# Patient Record
Sex: FEMALE | Race: WHITE | NOT HISPANIC OR LATINO | Employment: OTHER | URBAN - METROPOLITAN AREA
[De-identification: names, ages, dates, MRNs, and addresses within clinical notes are randomized per-mention and may not be internally consistent; named-entity substitution may affect disease eponyms.]

---

## 2017-11-22 ENCOUNTER — OFFICE VISIT (OUTPATIENT)
Dept: ORTHOPEDIC SURGERY | Facility: CLINIC | Age: 72
End: 2017-11-22

## 2017-11-22 DIAGNOSIS — M54.50 LUMBAR SPINE PAIN: Primary | ICD-10-CM

## 2017-11-22 DIAGNOSIS — M48.061 SPINAL STENOSIS OF LUMBAR REGION, UNSPECIFIED WHETHER NEUROGENIC CLAUDICATION PRESENT: ICD-10-CM

## 2017-11-22 PROCEDURE — 72100 X-RAY EXAM L-S SPINE 2/3 VWS: CPT | Performed by: ORTHOPAEDIC SURGERY

## 2017-11-22 PROCEDURE — 99214 OFFICE O/P EST MOD 30 MIN: CPT | Performed by: ORTHOPAEDIC SURGERY

## 2017-11-22 NOTE — PROGRESS NOTES
New patient or new problem visit    CC: Left lumbosacral pain    HPI: 6 week history of increasing pain in the left lumbosacral area occasional intermittent the radiation of pain is staying the same or worsening not improving.  It's aggravated by Pilates.  The size seems to help.  She is status post L1 kyphoplasty.  She does see her doctor for treatment of osteoporosis.    PFSH: See attached    ROS: See attached    PE: Constitutional: Vital signs above-noted.  Awake, alert and oriented    Psychiatric: Affect and insight do not appear grossly disturbed.    Pulmonary: Breathing is unlabored, color is good.    Skin: Warm, dry and normal turgor    Cardiac:  pedal pulses intact.  No edema.    Eyesight and hearing appear adequate for examination purposes      Musculoskeletal:  There is him right lumbosacral tenderness to percussion and palpation of the spine. Motion appears undisturbed.  Posture is unremarkable to coronal and sagittal inspection.    The skin about the area is intact.  There is no palpable or visible deformity.  There is no local spasm.       Neurologic:   Reflexes are 2+ and symmetrical in the patellae and achilles.   Motor function is undisturbed in quadriceps, EHL, and gastrocnemius      Sensation appears symmetrically intact to light touch   .  In the bilateral lower extremities there is no evidence of atrophy.   Clonus is absent..  Gait appears undisturbed.  SLR test negative    XRAY: Plain film x-rays of lumbar spine are fairly unremarkable no new fractures no change from prior films.    Other: n/a    Impression: I'm concerned she could have a sacral stress fracture alternatively this could be radiating from the back.  Indeed she has some ankle pain without soreness or tenderness which may suggest the referred picture.    Plan: Plan MRI scan of the lumbar spine to look for a sacral and/or lumbar fracture or neurologic compression.  I will call her with results.

## 2017-12-05 ENCOUNTER — APPOINTMENT (OUTPATIENT)
Dept: MRI IMAGING | Facility: HOSPITAL | Age: 72
End: 2017-12-05
Attending: ORTHOPAEDIC SURGERY

## 2017-12-12 ENCOUNTER — HOSPITAL ENCOUNTER (OUTPATIENT)
Dept: MRI IMAGING | Facility: HOSPITAL | Age: 72
Discharge: HOME | End: 2017-12-12
Attending: ORTHOPAEDIC SURGERY | Admitting: ORTHOPAEDIC SURGERY

## 2017-12-12 DIAGNOSIS — M54.50 LUMBAR SPINE PAIN: ICD-10-CM

## 2017-12-12 DIAGNOSIS — M48.061 SPINAL STENOSIS OF LUMBAR REGION, UNSPECIFIED WHETHER NEUROGENIC CLAUDICATION PRESENT: ICD-10-CM

## 2017-12-12 PROCEDURE — 72148 MRI LUMBAR SPINE W/O DYE: CPT

## 2017-12-18 ENCOUNTER — TELEPHONE (OUTPATIENT)
Dept: ORTHOPEDIC SURGERY | Facility: CLINIC | Age: 72
End: 2017-12-18

## 2017-12-19 DIAGNOSIS — M48.061 SPINAL STENOSIS OF LUMBAR REGION, UNSPECIFIED WHETHER NEUROGENIC CLAUDICATION PRESENT: Primary | ICD-10-CM

## 2017-12-19 NOTE — TELEPHONE ENCOUNTER
Spoke with pt and she was informed of her results.  She has been to PT in the past but is willing to try it again.  Order has been placed for her to go to PT at [facility] in [place], per her request.

## 2017-12-19 NOTE — TELEPHONE ENCOUNTER
"Tell her the lumbar MRI looks pretty good I don't see any obvious evidence of herniated disc, compressed nerve or fracture.  Unfortunately I don't see anything to \"go after \".  See if she once to go to physical therapy and send her there.  "

## 2018-06-21 ENCOUNTER — OFFICE VISIT (OUTPATIENT)
Dept: ORTHOPEDIC SURGERY | Facility: CLINIC | Age: 73
End: 2018-06-21

## 2018-06-21 VITALS — HEIGHT: 65 IN | BODY MASS INDEX: 25.29 KG/M2 | TEMPERATURE: 98 F | WEIGHT: 151.8 LBS

## 2018-06-21 DIAGNOSIS — G89.29 CHRONIC PAIN OF LEFT KNEE: Primary | ICD-10-CM

## 2018-06-21 DIAGNOSIS — M25.562 CHRONIC PAIN OF LEFT KNEE: Primary | ICD-10-CM

## 2018-06-21 PROCEDURE — 73562 X-RAY EXAM OF KNEE 3: CPT | Performed by: ORTHOPAEDIC SURGERY

## 2018-06-21 PROCEDURE — 99213 OFFICE O/P EST LOW 20 MIN: CPT | Performed by: ORTHOPAEDIC SURGERY

## 2018-06-21 NOTE — PROGRESS NOTES
Patient: Dorinda Elder  YOB: 1945 73 y.o. female  Medical Record Number: [id number]    Chief Complaints:   Chief Complaint   Patient presents with   • Left Knee - Follow-up, Pain       History of Present Illness:Dorinda Elder is a 73 y.o. female who presents for follow-up of  Left knee.  She has moderate aching throughout the knee also some pain in the posterior aspect.  It is worse with certain activities.  She does water exercises 4 days a week but still having moderate discomfort in the knee which she rates as a 6 on a scale of 1-10.  It has worsened over the last 6 months or so.    Allergies: No Known Allergies    Medications:   Current Outpatient Prescriptions   Medication Sig Dispense Refill   • Calcium 500 MG tablet Take 600 mg by mouth.     • Cholecalciferol (VITAMIN D3) 2000 UNITS tablet Take  by mouth.     • estradiol (MINIVELLE, VIVELLE-DOT) 0.05 MG/24HR patch Place 1 patch on the skin 1 (One) Time Per Week.     • fluconazole (DIFLUCAN) 150 MG tablet Take one tablet by mouth as needed for yeast infection, may repeat in 3 days if needed 2 tablet 0   • Misc Natural Products (GLUCOSAMINE CHONDROITIN ADV PO) Take  by mouth.     • Multiple Vitamin tablet Take  by mouth.     • OMEGA 3-6-9 FATTY ACIDS PO Take  by mouth.     • phenazopyridine (PYRIDIUM) 200 MG tablet Take 1 tablet by mouth 3 (Three) Times a Day As Needed for bladder spasms. 6 tablet 0     No current facility-administered medications for this visit.          The following portions of the patient's history were reviewed and updated as appropriate: allergies, current medications, past family history, past medical history, past social history, past surgical history and problem list.    Review of Systems:   A 14 point review of systems was performed. All systems negative except pertinent positives/negative listed in HPI above    Physical Exam:   Vitals:    06/21/18 0943   Temp: 98 °F (36.7 °C)   Weight: 68.9 kg (151 lb 12.8 oz)  "  Height: 165.1 cm (65\")   PainSc:   6       General: A and O x 3, ASA, NAD    SCLERA:    Normal    DENTITION:   Normal  Knee:  left    ALIGNMENT:     Slight valgus  ,   Patella  tracks  midline    GAIT:    Antalgic    SKIN:    No abnormality    RANGE OF MOTION:   0  -  125   DEG    STRENGTH:   4  / 5    LIGAMENTS:    No varus / valgus instability.   Negative  Lachman.    MENISCUS:     Negative   Samantha       DISTAL PULSES:    Paplable    DISTAL SENSATION :   Intact    LYMPHATICS:     No   lymphadenopathy    OTHER:          - Positive   effusion      - Crepitance with ROM       Radiology:  Xrays 3views left knee (ap,lateral, sunrise) were ordered and reviewed for evaluation of knee pain demonstratingmoderate joint space narrowing  todays xrays were compared to previous xrays and demonstrate no change    Assessment/Plan:  Left knee moderate valgus also arthritis.  I'm going to get her approved for Synvisc injection will have her return and see Tamara in a couple weeks for Synvisc injection of the left knee.  "

## 2018-07-27 ENCOUNTER — CLINICAL SUPPORT (OUTPATIENT)
Dept: ORTHOPEDIC SURGERY | Facility: CLINIC | Age: 73
End: 2018-07-27

## 2018-07-27 VITALS — HEIGHT: 65 IN | TEMPERATURE: 98.2 F | WEIGHT: 145 LBS | BODY MASS INDEX: 24.16 KG/M2

## 2018-07-27 DIAGNOSIS — M25.562 CHRONIC PAIN OF LEFT KNEE: ICD-10-CM

## 2018-07-27 DIAGNOSIS — G89.29 CHRONIC PAIN OF LEFT KNEE: ICD-10-CM

## 2018-07-27 PROCEDURE — 20610 DRAIN/INJ JOINT/BURSA W/O US: CPT | Performed by: NURSE PRACTITIONER

## 2018-07-27 RX ORDER — LIDOCAINE HYDROCHLORIDE 20 MG/ML
2 INJECTION, SOLUTION EPIDURAL; INFILTRATION; INTRACAUDAL; PERINEURAL
Status: COMPLETED | OUTPATIENT
Start: 2018-07-27 | End: 2018-07-27

## 2018-07-27 RX ADMIN — LIDOCAINE HYDROCHLORIDE 2 ML: 20 INJECTION, SOLUTION EPIDURAL; INFILTRATION; INTRACAUDAL; PERINEURAL at 14:29

## 2018-07-27 NOTE — PROGRESS NOTES
7/27/2018    Dorinda Elder is here today for worsening knee pain. Pt has undergone injection of the knee in the past with good resolution of symptoms. Pt is requesting a repeat injection.     KNEE Injection Procedure Note:    Large Joint Arthrocentesis  Date/Time: 7/27/2018 2:29 PM  Consent given by: patient  Site marked: site marked  Timeout: Immediately prior to procedure a time out was called to verify the correct patient, procedure, equipment, support staff and site/side marked as required   Supporting Documentation  Indications: pain   Procedure Details  Location: knee - L knee  Preparation: Patient was prepped and draped in the usual sterile fashion  Needle size: 22 G  Approach: anterolateral  Medications administered: 2 mL lidocaine PF 2% 2 %; 48 mg hylan 48 MG/6ML        Prior to injection risks were discussed clean pain, infection, leg blood sugar.  Patient verbalized understanding would like to proceed with injection    At the conclusion of the injection I discussed the importance of continued quad strengthening exercises on a daily basis. I will see the patient back if the symptoms should fail to improve or worsen.    Adriana Salamanca MA  7/27/2018

## 2018-08-20 ENCOUNTER — LAB (OUTPATIENT)
Dept: LAB | Facility: HOSPITAL | Age: 73
End: 2018-08-20

## 2018-08-20 ENCOUNTER — TRANSCRIBE ORDERS (OUTPATIENT)
Dept: LAB | Facility: HOSPITAL | Age: 73
End: 2018-08-20

## 2018-08-20 ENCOUNTER — HOSPITAL ENCOUNTER (OUTPATIENT)
Dept: CARDIOLOGY | Facility: HOSPITAL | Age: 73
Discharge: HOME | End: 2018-08-20
Admitting: SURGERY

## 2018-08-20 ENCOUNTER — TRANSCRIBE ORDERS (OUTPATIENT)
Dept: CARDIOLOGY | Facility: HOSPITAL | Age: 73
End: 2018-08-20

## 2018-08-20 DIAGNOSIS — E87.8 ELECTROLYTE IMBALANCE: Primary | ICD-10-CM

## 2018-08-20 DIAGNOSIS — E87.8 ELECTROLYTE IMBALANCE: ICD-10-CM

## 2018-08-20 LAB
DEPRECATED RDW RBC AUTO: 46.8 FL (ref 37–54)
ERYTHROCYTE [DISTWIDTH] IN BLOOD BY AUTOMATED COUNT: 13.6 % (ref 11.7–13)
HCT VFR BLD AUTO: 40.8 % (ref 35.6–45.5)
HGB BLD-MCNC: 12.8 G/DL (ref 11.9–15.5)
MCH RBC QN AUTO: 29.8 PG (ref 26.9–32)
MCHC RBC AUTO-ENTMCNC: 31.4 G/DL (ref 32.4–36.3)
MCV RBC AUTO: 95.1 FL (ref 80.5–98.2)
PLATELET # BLD AUTO: 223 10*3/MM3 (ref 140–500)
PMV BLD AUTO: 9.7 FL (ref 6–12)
POTASSIUM BLD-SCNC: 4.2 MMOL/L (ref 3.5–5.2)
RBC # BLD AUTO: 4.29 10*6/MM3 (ref 3.9–5.2)
WBC NRBC COR # BLD: 4.86 10*3/MM3 (ref 4.5–10.7)

## 2018-08-20 PROCEDURE — 84132 ASSAY OF SERUM POTASSIUM: CPT

## 2018-08-20 PROCEDURE — 85027 COMPLETE CBC AUTOMATED: CPT

## 2018-08-20 PROCEDURE — 36415 COLL VENOUS BLD VENIPUNCTURE: CPT

## 2018-08-20 PROCEDURE — 93005 ELECTROCARDIOGRAM TRACING: CPT

## 2018-08-20 PROCEDURE — 93010 ELECTROCARDIOGRAM REPORT: CPT | Performed by: INTERNAL MEDICINE

## 2018-08-21 ENCOUNTER — TRANSCRIBE ORDERS (OUTPATIENT)
Dept: CARDIOLOGY | Facility: HOSPITAL | Age: 73
End: 2018-08-21

## 2018-08-21 ENCOUNTER — HOSPITAL ENCOUNTER (OUTPATIENT)
Dept: CARDIOLOGY | Facility: HOSPITAL | Age: 73
Discharge: HOME | End: 2018-08-21
Admitting: SURGERY

## 2018-08-21 DIAGNOSIS — E87.8 ELECTROLYTE IMBALANCE: ICD-10-CM

## 2018-08-21 DIAGNOSIS — E87.8 ELECTROLYTE IMBALANCE: Primary | ICD-10-CM

## 2018-08-21 PROCEDURE — 93005 ELECTROCARDIOGRAM TRACING: CPT | Performed by: SURGERY

## 2018-08-21 PROCEDURE — 93010 ELECTROCARDIOGRAM REPORT: CPT | Performed by: INTERNAL MEDICINE

## 2018-10-22 PROBLEM — S22.009A CLOSED FRACTURE OF DORSAL (THORACIC) VERTEBRA (HCC): Status: ACTIVE | Noted: 2018-10-22

## 2018-10-23 ENCOUNTER — OFFICE VISIT (OUTPATIENT)
Dept: SURGERY | Facility: CLINIC | Age: 73
End: 2018-10-23

## 2018-10-23 VITALS
SYSTOLIC BLOOD PRESSURE: 120 MMHG | RESPIRATION RATE: 19 BRPM | HEART RATE: 91 BPM | WEIGHT: 145 LBS | BODY MASS INDEX: 24.16 KG/M2 | DIASTOLIC BLOOD PRESSURE: 80 MMHG | HEIGHT: 65 IN | OXYGEN SATURATION: 94 %

## 2018-10-23 DIAGNOSIS — K62.89 DECREASED ANAL SPHINCTER TONE: ICD-10-CM

## 2018-10-23 DIAGNOSIS — N81.6 RECTOCELE: ICD-10-CM

## 2018-10-23 DIAGNOSIS — R15.1 FECAL SOILING: Primary | ICD-10-CM

## 2018-10-23 PROCEDURE — 99213 OFFICE O/P EST LOW 20 MIN: CPT | Performed by: COLON & RECTAL SURGERY
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
## 2018-11-29 ENCOUNTER — TELEPHONE (OUTPATIENT)
Dept: ORTHOPEDIC SURGERY | Facility: CLINIC | Age: 73
End: 2018-11-29

## 2018-11-29 NOTE — TELEPHONE ENCOUNTER
Patient scheduled Synvisc injection of the left knee on 2/1/19. Need order in [facility] please.

## 2019-02-08 ENCOUNTER — OFFICE VISIT (OUTPATIENT)
Dept: ORTHOPEDIC SURGERY | Facility: CLINIC | Age: 74
End: 2019-02-08

## 2019-02-08 VITALS — WEIGHT: 154 LBS | BODY MASS INDEX: 26.29 KG/M2 | HEIGHT: 64 IN | TEMPERATURE: 98.2 F

## 2019-02-08 DIAGNOSIS — M17.12 PRIMARY OSTEOARTHRITIS OF LEFT KNEE: Primary | ICD-10-CM

## 2019-02-08 PROCEDURE — 20610 DRAIN/INJ JOINT/BURSA W/O US: CPT | Performed by: NURSE PRACTITIONER

## 2019-02-08 RX ORDER — LIDOCAINE HYDROCHLORIDE 10 MG/ML
4 INJECTION, SOLUTION EPIDURAL; INFILTRATION; INTRACAUDAL; PERINEURAL
Status: COMPLETED | OUTPATIENT
Start: 2019-02-08 | End: 2019-02-08

## 2019-02-08 RX ADMIN — LIDOCAINE HYDROCHLORIDE 4 ML: 10 INJECTION, SOLUTION EPIDURAL; INFILTRATION; INTRACAUDAL; PERINEURAL at 14:09

## 2019-02-08 NOTE — PROGRESS NOTES
2/8/2019    Dorinda Elder is here today for worsening knee pain. Pt has undergone injection of the knee in the past with good resolution of symptoms. Pt is requesting a repeat injection.     KNEE Injection Procedure Note:    Large Joint Arthrocentesis: L knee  Date/Time: 2/8/2019 2:09 PM  Consent given by: patient  Site marked: site marked  Timeout: Immediately prior to procedure a time out was called to verify the correct patient, procedure, equipment, support staff and site/side marked as required   Supporting Documentation  Indications: pain   Procedure Details  Location: knee - L knee  Preparation: Patient was prepped and draped in the usual sterile fashion  Needle size: 25 G  Approach: anterolateral  Medications administered: 48 mg hylan 48 MG/6ML; 4 mL lidocaine PF 1% 1 %  Patient tolerance: patient tolerated the procedure well with no immediate complications        Prior to injection risks were discussed including pain and infection.  Patient verbalized understanding like to proceed with injection  At the conclusion of the injection I discussed the importance of continued quad strengthening exercises on a daily basis. I will see the patient back if the symptoms should fail to improve or worsen.      Venus Mendez APRN  2/8/2019

## 2019-05-31 ENCOUNTER — OFFICE VISIT (OUTPATIENT)
Dept: ORTHOPEDIC SURGERY | Facility: CLINIC | Age: 74
End: 2019-05-31

## 2019-05-31 VITALS — TEMPERATURE: 98 F | HEIGHT: 65 IN | WEIGHT: 145 LBS | BODY MASS INDEX: 24.16 KG/M2

## 2019-05-31 DIAGNOSIS — M25.521 RIGHT ELBOW PAIN: Primary | ICD-10-CM

## 2019-05-31 DIAGNOSIS — M77.11 LATERAL EPICONDYLITIS OF RIGHT ELBOW: ICD-10-CM

## 2019-05-31 PROCEDURE — 99213 OFFICE O/P EST LOW 20 MIN: CPT | Performed by: ORTHOPAEDIC SURGERY

## 2019-05-31 PROCEDURE — 73070 X-RAY EXAM OF ELBOW: CPT | Performed by: ORTHOPAEDIC SURGERY

## 2019-06-25 ENCOUNTER — TELEPHONE (OUTPATIENT)
Dept: ORTHOPEDIC SURGERY | Facility: CLINIC | Age: 74
End: 2019-06-25

## 2019-06-25 DIAGNOSIS — M25.521 RIGHT ELBOW PAIN: Primary | ICD-10-CM

## 2019-06-25 NOTE — TELEPHONE ENCOUNTER
"Patient called stating she wishes to go to [facility] \"[facility] the [facility]\" for Rt elbow. Fax # [phone].   Patient would wong to start PT on Tues 7/02/19 due to patient's work schedule. Patient informed [facility] out of office till Mon 7/01/19 & patient asked if another provider can submit PT order while [facility] out? Thanks /srh  "

## 2019-08-09 ENCOUNTER — TELEPHONE (OUTPATIENT)
Dept: ORTHOPEDIC SURGERY | Facility: CLINIC | Age: 74
End: 2019-08-09

## 2019-08-09 NOTE — TELEPHONE ENCOUNTER
Patient is out of town and had to reschedule a Synvisc injection for today. Moved appt to 10/4/19 and the prior authorization for the injection expires on 9/13/19. Message sent to dm to get authorization extended.

## 2019-10-04 ENCOUNTER — CLINICAL SUPPORT (OUTPATIENT)
Dept: ORTHOPEDIC SURGERY | Facility: CLINIC | Age: 74
End: 2019-10-04

## 2019-10-04 VITALS — WEIGHT: 148 LBS | BODY MASS INDEX: 24.66 KG/M2 | HEIGHT: 65 IN

## 2019-10-04 DIAGNOSIS — M25.562 CHRONIC PAIN OF LEFT KNEE: Primary | ICD-10-CM

## 2019-10-04 DIAGNOSIS — G89.29 CHRONIC PAIN OF LEFT KNEE: Primary | ICD-10-CM

## 2019-10-04 PROCEDURE — 73562 X-RAY EXAM OF KNEE 3: CPT | Performed by: NURSE PRACTITIONER

## 2019-10-04 PROCEDURE — 20610 DRAIN/INJ JOINT/BURSA W/O US: CPT | Performed by: NURSE PRACTITIONER

## 2019-10-04 RX ORDER — METHOCARBAMOL 750 MG/1
TABLET, FILM COATED ORAL
Refills: 1 | COMMUNITY
Start: 2019-07-19 | End: 2022-04-29

## 2019-10-04 RX ORDER — CEPHALEXIN 500 MG/1
CAPSULE ORAL
Refills: 0 | COMMUNITY
Start: 2019-07-19 | End: 2022-04-29

## 2019-10-04 RX ORDER — ONDANSETRON 8 MG/1
TABLET, ORALLY DISINTEGRATING ORAL
Refills: 0 | COMMUNITY
Start: 2019-07-19 | End: 2022-04-29

## 2019-10-04 RX ORDER — SILVER SULFADIAZINE 1 %
CREAM (GRAM) TOPICAL
Refills: 0 | COMMUNITY
Start: 2019-08-02 | End: 2022-04-29

## 2019-10-04 RX ORDER — CHLORAL HYDRATE 500 MG
CAPSULE ORAL DAILY
COMMUNITY
End: 2023-03-16 | Stop reason: HOSPADM

## 2019-10-04 RX ORDER — OXYCODONE HYDROCHLORIDE AND ACETAMINOPHEN 5; 325 MG/1; MG/1
TABLET ORAL
Refills: 0 | COMMUNITY
Start: 2019-07-19 | End: 2022-04-29

## 2019-10-04 RX ORDER — ACYCLOVIR 400 MG/1
400 TABLET ORAL 2 TIMES DAILY PRN
Refills: 3 | COMMUNITY
Start: 2019-06-28

## 2019-10-04 RX ORDER — POLYMYXIN B SULFATE AND TRIMETHOPRIM 1; 10000 MG/ML; [USP'U]/ML
1 SOLUTION OPHTHALMIC 4 TIMES DAILY
COMMUNITY
Start: 2019-04-20 | End: 2022-04-29

## 2019-10-04 NOTE — PROGRESS NOTES
10/4/2019    Dorinda Elder is here today for worsening knee pain. Pt has undergone injection of the knee in the past with good resolution of symptoms. Pt is requesting a repeat injection.     KNEE Injection Procedure Note:    Large Joint Arthrocentesis: L knee  Date/Time: 10/4/2019 2:56 PM  Consent given by: patient  Site marked: site marked  Timeout: Immediately prior to procedure a time out was called to verify the correct patient, procedure, equipment, support staff and site/side marked as required   Supporting Documentation  Indications: pain and joint swelling   Procedure Details  Location: knee - L knee  Preparation: Patient was prepped and draped in the usual sterile fashion  Needle size: 22 G  Approach: anterolateral  Medications administered: 4 mL lidocaine (cardiac); 8 mg/mL Hylan 16 MG/2ML  Patient tolerance: patient tolerated the procedure well with no immediate complications      Patient I discussed treatment options she would like to proceed with Synvisc injection.  Prior to injection risks were discussed including pain and infection.  Patient verbalized understanding would like to proceed with injection.  In addition 3 views left knee were ordered and reviewed today secondary to pain show significant osteoarthritis.  Compared to views are unchanged    At the conclusion of the injection I discussed the importance of continued quad strengthening exercises on a daily basis. I will see the patient back if the symptoms should fail to improve or worsen.    Venus Mendez APRN  10/4/2019

## 2020-04-29 ENCOUNTER — TELEPHONE (OUTPATIENT)
Dept: ORTHOPEDIC SURGERY | Facility: CLINIC | Age: 75
End: 2020-04-29

## 2020-04-29 NOTE — TELEPHONE ENCOUNTER
I do not think this would be amenable to video visit.  If she can come on and today I  Would be happy to see her, otherwise I can see her next week..  Please let me know

## 2020-05-04 ENCOUNTER — OFFICE VISIT (OUTPATIENT)
Dept: ORTHOPEDIC SURGERY | Facility: CLINIC | Age: 75
End: 2020-05-04

## 2020-05-04 VITALS — WEIGHT: 150 LBS | BODY MASS INDEX: 24.99 KG/M2 | HEIGHT: 65 IN | TEMPERATURE: 98.2 F

## 2020-05-04 DIAGNOSIS — M25.571 RIGHT ANKLE PAIN, UNSPECIFIED CHRONICITY: ICD-10-CM

## 2020-05-04 DIAGNOSIS — M21.40 PES PLANUS, UNSPECIFIED LATERALITY: ICD-10-CM

## 2020-05-04 DIAGNOSIS — M76.829 POSTERIOR TIBIAL TENDON DYSFUNCTION: ICD-10-CM

## 2020-05-04 DIAGNOSIS — M76.821 POSTERIOR TIBIAL TENDONITIS, RIGHT: Primary | ICD-10-CM

## 2020-05-04 PROCEDURE — 99214 OFFICE O/P EST MOD 30 MIN: CPT | Performed by: ORTHOPAEDIC SURGERY

## 2020-05-04 PROCEDURE — 73610 X-RAY EXAM OF ANKLE: CPT | Performed by: ORTHOPAEDIC SURGERY
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
## 2020-05-14 ENCOUNTER — HOSPITAL ENCOUNTER (OUTPATIENT)
Dept: MRI IMAGING | Facility: HOSPITAL | Age: 75
Discharge: HOME | End: 2020-05-14
Admitting: ORTHOPAEDIC SURGERY

## 2020-05-14 DIAGNOSIS — M76.829 POSTERIOR TIBIAL TENDON DYSFUNCTION: ICD-10-CM

## 2020-05-14 DIAGNOSIS — M76.821 POSTERIOR TIBIAL TENDONITIS, RIGHT: ICD-10-CM

## 2020-05-14 PROCEDURE — 73721 MRI JNT OF LWR EXTRE W/O DYE: CPT

## 2020-05-15 ENCOUNTER — TELEPHONE (OUTPATIENT)
Dept: ORTHOPEDIC SURGERY | Facility: CLINIC | Age: 75
End: 2020-05-15

## 2020-05-15 NOTE — TELEPHONE ENCOUNTER
Patient had a MRI done on the right ankle 5/14. When would you like the patient to be seen for a follow up appointment? Your schedule is full until 6/1.

## 2020-05-18 ENCOUNTER — TELEPHONE (OUTPATIENT)
Dept: ORTHOPEDIC SURGERY | Facility: CLINIC | Age: 75
End: 2020-05-18

## 2020-05-18 DIAGNOSIS — M76.821 POSTERIOR TIBIAL TENDONITIS, RIGHT: ICD-10-CM

## 2020-05-18 DIAGNOSIS — M76.829 POSTERIOR TIBIAL TENDON DYSFUNCTION: Primary | ICD-10-CM

## 2020-05-18 NOTE — TELEPHONE ENCOUNTER
I tried to call pt to review results but had to leave a message to call back, please  go ahead and schedule her for 6/1 and will try to keep calling her about results,thanks

## 2020-05-18 NOTE — TELEPHONE ENCOUNTER
I returned patient's call about MRI results she said that the PT TD brace has helped to some degree still gets discomfort especially upon first getting up in the morning.  She has been using topical anti-inflammatory as well.    Reviewed her MRI results with her and will have her continue with her brace and topical anti-inflammatories for now and at her request begin some physical therapy.  She wants to go to [facility]    We will see her back as scheduled in about 2 weeks and determine treatment course from there as to whether to try some orthotics, more robust bracing, or surgical treatment.  She appreciated the call

## 2020-09-08 ENCOUNTER — CLINICAL SUPPORT (OUTPATIENT)
Dept: ORTHOPEDIC SURGERY | Facility: CLINIC | Age: 75
End: 2020-09-08

## 2020-09-08 VITALS — HEIGHT: 65 IN | TEMPERATURE: 98 F | BODY MASS INDEX: 24.18 KG/M2 | WEIGHT: 145.1 LBS

## 2020-09-08 DIAGNOSIS — M17.12 PRIMARY OSTEOARTHRITIS OF LEFT KNEE: Primary | ICD-10-CM

## 2020-09-08 PROCEDURE — 99213 OFFICE O/P EST LOW 20 MIN: CPT | Performed by: NURSE PRACTITIONER

## 2020-09-08 PROCEDURE — 20610 DRAIN/INJ JOINT/BURSA W/O US: CPT | Performed by: NURSE PRACTITIONER

## 2020-09-08 NOTE — PROGRESS NOTES
Patient: Dorinda Elder  YOB: 1945 75 y.o. female  Medical Record Number: [id number]    Chief Complaints:   Chief Complaint   Patient presents with   • Left Knee - Follow-up, Pain       History of Present Illness:Dorinda Elder is a 75 y.o. female who presents with complaints of increased left knee pain.  She denies any recent injury.  Describes the left knee pain as a moderate constant ache worse with standing walking, better with rest    Allergies: No Known Allergies    Medications:   Current Outpatient Medications   Medication Sig Dispense Refill   • acyclovir (ZOVIRAX) 400 MG tablet Take 400 mg by mouth 2 (Two) Times a Day.  3   • Calcium 500 MG tablet Take 600 mg by mouth.     • cephalexin (KEFLEX) 500 MG capsule TK 1 C PO TID  0   • Cholecalciferol (VITAMIN D3) 2000 UNITS tablet Take 1 tablet by mouth Daily.     • diclofenac (VOLTAREN) 1 % gel gel Apply 4 g topically to the appropriate area as directed 2 (Two) Times a Day. Small amount to affected area 100 g 0   • diclofenac (VOLTAREN) 1 % gel gel APPLY 4 GRAMS TOPICALLY TO THE AFFECTED AREA AS DIRECTED TWICE DAILY. USE PER PACKAGE INSERT 100 g 2   • estradiol (MINIVELLE, VIVELLE-DOT) 0.05 MG/24HR patch Place 1 patch on the skin 1 (One) Time Per Week.     • methocarbamol (ROBAXIN) 750 MG tablet TK 1 T PO Q 4 H  1   • Misc Natural Products (GLUCOSAMINE CHONDROITIN ADV PO) Take 1 tablet by mouth Daily.     • Multiple Vitamin tablet Take 1 tablet by mouth Daily.     • OMEGA 3-6-9 FATTY ACIDS PO Take 1 tablet by mouth Daily.     • Omega-3 1000 MG capsule Take  by mouth Daily.     • ondansetron ODT (ZOFRAN-ODT) 8 MG disintegrating tablet DISSOLVE 1 T UNDER THE TONGUE Q 4-6 H PRN NV  0   • oxyCODONE-acetaminophen (PERCOCET) 5-325 MG per tablet TK 1 T PO Q 4-6 H PRN P  0   • progesterone (PROMETRIUM) 100 MG capsule Take 100 mg by mouth Daily.     • SSD 1 % cream APPLY TO AFFECTED AREA BID  0   • trimethoprim-polymyxin b (POLYTRIM) 26031-5.1 UNIT/ML-%  "ophthalmic solution Inject 1 drop into the eye 4 (Four) Times a Day.       No current facility-administered medications for this visit.          The following portions of the patient's history were reviewed and updated as appropriate: allergies, current medications, past family history, past medical history, past social history, past surgical history and problem list.    Review of Systems:   A 14 point review of systems was performed. All systems negative except pertinent positives/negative listed in HPI above    Physical Exam:   Vitals:    09/08/20 1006   Temp: 98 °F (36.7 °C)   Weight: 65.8 kg (145 lb 1.6 oz)   Height: 165.1 cm (65\")   PainSc:   8       General: A and O x 3, ASA, NAD    SCLERA:    Normal    DENTITION:   Normal  Skin clear no unusual lesions noted  Left knee patient has no appreciable effusion limited range of motion secondary to pain calf is soft and nontender    Assessment/Plan:  Osteoarthritis left knee    Patient discussed treatment options, she would like to proceed with left knee Synvisc injection.  Prior to injection risks were discussed including pain infection.  Patient verbalized understanding would like to proceed with injection she will continue with physical therapy exercises we will see her back as needed    Large Joint Arthrocentesis: L knee  Date/Time: 9/8/2020 8:04 AM  Consent given by: patient  Site marked: site marked  Timeout: Immediately prior to procedure a time out was called to verify the correct patient, procedure, equipment, support staff and site/side marked as required   Supporting Documentation  Indications: pain   Procedure Details  Location: knee - L knee  Preparation: Patient was prepped and draped in the usual sterile fashion  Needle gauge: 21g.  Approach: lateral  Medications administered: 48 mg hylan 48 MG/6ML; 2 mL lidocaine (cardiac)  Patient tolerance: patient tolerated the procedure well with no immediate complications          "

## 2021-03-02 DIAGNOSIS — Z23 IMMUNIZATION DUE: ICD-10-CM

## 2021-05-11 ENCOUNTER — CLINICAL SUPPORT (OUTPATIENT)
Dept: ORTHOPEDIC SURGERY | Facility: CLINIC | Age: 76
End: 2021-05-11

## 2021-05-11 VITALS — HEIGHT: 65 IN | TEMPERATURE: 98 F | WEIGHT: 150 LBS | BODY MASS INDEX: 24.99 KG/M2

## 2021-05-11 DIAGNOSIS — M76.821 POSTERIOR TIBIAL TENDONITIS, RIGHT: Primary | ICD-10-CM

## 2021-05-11 DIAGNOSIS — M17.12 PRIMARY OSTEOARTHRITIS OF LEFT KNEE: ICD-10-CM

## 2021-05-11 DIAGNOSIS — M17.11 PRIMARY OSTEOARTHRITIS OF RIGHT KNEE: ICD-10-CM

## 2021-05-11 PROCEDURE — 20610 DRAIN/INJ JOINT/BURSA W/O US: CPT | Performed by: NURSE PRACTITIONER

## 2021-05-11 NOTE — PROGRESS NOTES
5/11/2021    Dorinda Elder is here today for worsening knee pain. Pt has undergone injection of the knee in the past with good resolution of symptoms. Pt is requesting a repeat injection.     KNEE Injection Procedure Note:    Large Joint Arthrocentesis: L knee  Date/Time: 5/11/2021 10:36 AM  Consent given by: patient  Site marked: site marked  Timeout: Immediately prior to procedure a time out was called to verify the correct patient, procedure, equipment, support staff and site/side marked as required   Supporting Documentation  Indications: pain and joint swelling   Procedure Details  Location: knee - L knee  Preparation: Patient was prepped and draped in the usual sterile fashion  Needle size: 22 G  Approach: anterolateral  Medications administered: 2 mL lidocaine (cardiac); 48 mg hylan 48 MG/6ML  Patient tolerance: patient tolerated the procedure well with no immediate complications          At the conclusion of the injection I discussed the importance of continued quad strengthening exercises on a daily basis. I will see the patient back if the symptoms should fail to improve or worsen.    Venus Mendez, APRN  5/11/2021

## 2022-04-29 ENCOUNTER — OFFICE VISIT (OUTPATIENT)
Dept: SURGERY | Facility: CLINIC | Age: 77
End: 2022-04-29

## 2022-04-29 VITALS
DIASTOLIC BLOOD PRESSURE: 68 MMHG | HEIGHT: 65 IN | SYSTOLIC BLOOD PRESSURE: 108 MMHG | OXYGEN SATURATION: 96 % | TEMPERATURE: 97 F | HEART RATE: 90 BPM | BODY MASS INDEX: 24.96 KG/M2

## 2022-04-29 DIAGNOSIS — K62.5 RECTAL BLEED: Primary | ICD-10-CM

## 2022-04-29 DIAGNOSIS — K64.8 INTERNAL HEMORRHOIDS WITH COMPLICATION: ICD-10-CM

## 2022-04-29 PROCEDURE — 99204 OFFICE O/P NEW MOD 45 MIN: CPT | Performed by: COLON & RECTAL SURGERY

## 2022-04-29 RX ORDER — HYDROCORTISONE 25 MG/G
CREAM TOPICAL
Qty: 30 G | Refills: 1 | Status: SHIPPED | OUTPATIENT
Start: 2022-04-29

## 2022-08-29 ENCOUNTER — TELEPHONE (OUTPATIENT)
Dept: SURGERY | Facility: CLINIC | Age: 77
End: 2022-08-29

## 2022-10-10 ENCOUNTER — TELEPHONE (OUTPATIENT)
Dept: SURGERY | Facility: CLINIC | Age: 77
End: 2022-10-10

## 2023-03-06 ENCOUNTER — OFFICE VISIT (OUTPATIENT)
Dept: ORTHOPEDIC SURGERY | Facility: CLINIC | Age: 78
End: 2023-03-06
Payer: MEDICARE

## 2023-03-06 VITALS — HEIGHT: 65 IN | WEIGHT: 145 LBS | BODY MASS INDEX: 24.16 KG/M2 | TEMPERATURE: 97.6 F

## 2023-03-06 DIAGNOSIS — M25.551 RIGHT HIP PAIN: ICD-10-CM

## 2023-03-06 DIAGNOSIS — M70.61 TROCHANTERIC BURSITIS OF RIGHT HIP: ICD-10-CM

## 2023-03-06 DIAGNOSIS — R52 PAIN: Primary | ICD-10-CM

## 2023-03-06 PROCEDURE — 99213 OFFICE O/P EST LOW 20 MIN: CPT | Performed by: NURSE PRACTITIONER

## 2023-03-06 PROCEDURE — 20610 DRAIN/INJ JOINT/BURSA W/O US: CPT | Performed by: NURSE PRACTITIONER

## 2023-03-06 PROCEDURE — 73502 X-RAY EXAM HIP UNI 2-3 VIEWS: CPT | Performed by: NURSE PRACTITIONER

## 2023-03-06 RX ORDER — LIDOCAINE HYDROCHLORIDE 10 MG/ML
3 INJECTION, SOLUTION EPIDURAL; INFILTRATION; INTRACAUDAL; PERINEURAL
Status: COMPLETED | OUTPATIENT
Start: 2023-03-06 | End: 2023-03-06

## 2023-03-06 RX ORDER — METHYLPREDNISOLONE ACETATE 80 MG/ML
80 INJECTION, SUSPENSION INTRA-ARTICULAR; INTRALESIONAL; INTRAMUSCULAR; SOFT TISSUE
Status: COMPLETED | OUTPATIENT
Start: 2023-03-06 | End: 2023-03-06

## 2023-03-06 RX ADMIN — METHYLPREDNISOLONE ACETATE 80 MG: 80 INJECTION, SUSPENSION INTRA-ARTICULAR; INTRALESIONAL; INTRAMUSCULAR; SOFT TISSUE at 16:56

## 2023-03-06 RX ADMIN — LIDOCAINE HYDROCHLORIDE 3 ML: 10 INJECTION, SOLUTION EPIDURAL; INFILTRATION; INTRACAUDAL; PERINEURAL at 16:56

## 2023-03-06 NOTE — PROGRESS NOTES
Patient: Dorinda Elder  YOB: 1945 78 y.o. female  Medical Record Number: [id number]    Chief Complaints:   Chief Complaint   Patient presents with   • Right Hip - Initial Evaluation       History of Present Illness:Dorinda Elder is a 78 y.o. female who presents with complaints of having right hip pain that started last week without any known injury.  Patient states that she was on vacation and did a lot of walking and had to lugg luggage around.  Patient reports that she did not have any specific injury to the hip, but she started having severe right-sided posterior lateral hip pain.  Patient states that the pain was sufficient enough to where she started having to use a cane.  She states over the last few days her symptoms have slightly improved.  Patient reports the symptoms were worse with prolonged walking and laying on her right side.  Patient denies any signs or symptoms of infection, and she is without any other significant complaints today.    Allergies: No Known Allergies    Medications:   Current Outpatient Medications   Medication Sig Dispense Refill   • acyclovir (ZOVIRAX) 400 MG tablet Take 1 tablet by mouth 2 (Two) Times a Day As Needed.  3   • Calcium 500 MG tablet Take 600 mg by mouth.     • Cholecalciferol (VITAMIN D3) 2000 UNITS tablet Take 1 tablet by mouth Daily.     • estradiol (MINIVELLE, VIVELLE-DOT) 0.05 MG/24HR patch Place 1 patch on the skin as directed by provider 1 (One) Time Per Week.     • Hydrocortisone, Perianal, (Anusol-HC) 2.5 % rectal cream Apply rectally 3 times daily.  Include applicator. 30 g 1   • Misc Natural Products (GLUCOSAMINE CHONDROITIN ADV PO) Take 1 tablet by mouth Daily.     • Multiple Vitamin tablet Take 1 tablet by mouth Daily.     • neomycin-polymyxin-dexamethasone (MAXITROL) 0.1 % ophthalmic suspension Administer 1 drop to both eyes 4 (Four) Times a Day. 5 mL 0   • Omega-3 1000 MG capsule Take  by mouth Daily.       No current  [facility] Emergency Department  [address] [address]  Phone:  [phone]  Fax:  [phone]                                    Harlan Allen   MRN: [id number]    Department:  [facility] Emergency Department   Date of Visit:  4/4/2019           After Visit Summary Signature Page    I have received my discharge instructions, and my questions have been answered. I have discussed any challenges I see with this plan with the nurse or doctor.    ..........................................................................................................................................  Patient/Patient Representative Signature      ..........................................................................................................................................  Patient Representative Print Name and Relationship to Patient    ..................................................               ................................................  Date                                   Time    ..........................................................................................................................................  Reviewed by Signature/Title    ...................................................              ..............................................  Date                                               Time          22EPIC Rev 08/18        "facility-administered medications for this visit.         The following portions of the patient's history were reviewed and updated as appropriate: allergies, current medications, past family history, past medical history, past social history, past surgical history and problem list.    Review of Systems:   Pertinent positives/negative listed in HPI above    Physical Exam:   Vitals:    03/06/23 1554   Temp: 97.6 °F (36.4 °C)   TempSrc: Temporal   Weight: 65.8 kg (145 lb)   Height: 165.1 cm (65\")   PainSc:   7       General: A and O x 3, ASA, NAD      Hip:  right    LEG ALIGNMENT:     Neutral   ,    equal leg lengths    GAIT:     Nonantalgic    SKIN:     No abnormality    RANGE OF MOTION:      Full with mild joint irritability    STRENGTH:     5 / 5    hip flexion and abduction    DISTAL PULSES:    Paplable    DISTAL SENSATION :   Intact    LYMPHATICS:     No   lymphadenopathy    OTHER:          - Negative Stinchfeld test      - Negative log roll      +Tenderness to palpation trochanteric bursa         Radiology:  Xrays 2views right hip (AP bilateral hips, and lateral of the hip) ordered and reviewed for evaluation of hip pain  demonstrating  mild joint space narrowing with periarticular osteophytes present specifically to the anterior portion of the hip.  She also has 3 hip screws that are intact.  She also has spurring of the greater trochanter indicative of hip bursitis.  No other hip x-rays available for comparison purposes.    Assessment/Plan: Trochanteric bursitis right hip    Treatment options as well as imaging results were discussed in detail with the patient.  Patient does have some arthritic changes about the hip, however she seems to be exhibiting classic signs of trochanteric bursitis.  We will give the patient a prescription for physical therapy for hip fascial stretches and exercises.  They can do modalities as indicated.  Were also going to do a bursal injection today to the right hip.  Should the " patient symptoms not be improved in the next 3 months she can follow back up with me for reevaluation.    Large Joint Arthrocentesis: R greater trochanteric bursa  Date/Time: 3/6/2023 4:56 PM  Consent given by: patient  Site marked: site marked  Timeout: Immediately prior to procedure a time out was called to verify the correct patient, procedure, equipment, support staff and site/side marked as required   Supporting Documentation  Indications: pain   Procedure Details  Location: hip - R greater trochanteric bursa  Preparation: Patient was prepped and draped in the usual sterile fashion  Needle size: 18 G  Approach: lateral  Medications administered: 80 mg methylPREDNISolone acetate 80 MG/ML; 3 mL lidocaine PF 1% 1 %  Patient tolerance: patient tolerated the procedure well with no immediate complications              Diagnoses and all orders for this visit:    1. Pain (Primary)  -     XR Hip With or Without Pelvis 2 - 3 View Right    2. Trochanteric bursitis of right hip  -     Ambulatory Referral to Physical Therapy Evaluate and treat, Ortho        CHAU Hinojosa  3/6/2023

## 2023-03-16 ENCOUNTER — ANESTHESIA (OUTPATIENT)
Dept: GASTROENTEROLOGY | Facility: HOSPITAL | Age: 78
End: 2023-03-16
Payer: MEDICARE

## 2023-03-16 ENCOUNTER — HOSPITAL ENCOUNTER (OUTPATIENT)
Facility: HOSPITAL | Age: 78
Setting detail: HOSPITAL OUTPATIENT SURGERY
Discharge: HOME | End: 2023-03-16
Attending: COLON & RECTAL SURGERY | Admitting: COLON & RECTAL SURGERY
Payer: MEDICARE

## 2023-03-16 ENCOUNTER — ANESTHESIA EVENT (OUTPATIENT)
Dept: GASTROENTEROLOGY | Facility: HOSPITAL | Age: 78
End: 2023-03-16
Payer: MEDICARE

## 2023-03-16 VITALS
RESPIRATION RATE: 12 BRPM | HEIGHT: 65 IN | OXYGEN SATURATION: 98 % | SYSTOLIC BLOOD PRESSURE: 108 MMHG | DIASTOLIC BLOOD PRESSURE: 58 MMHG | HEART RATE: 70 BPM | WEIGHT: 149 LBS | BODY MASS INDEX: 24.83 KG/M2

## 2023-03-16 DIAGNOSIS — K62.5 RECTAL BLEED: ICD-10-CM

## 2023-03-16 DIAGNOSIS — K64.8 INTERNAL HEMORRHOIDS WITH COMPLICATION: ICD-10-CM

## 2023-03-16 PROCEDURE — 45385 COLONOSCOPY W/LESION REMOVAL: CPT | Performed by: COLON & RECTAL SURGERY

## 2023-03-16 PROCEDURE — 25010000002 KETOROLAC TROMETHAMINE PER 15 MG: Performed by: ANESTHESIOLOGY

## 2023-03-16 PROCEDURE — 88305 TISSUE EXAM BY PATHOLOGIST: CPT | Performed by: COLON & RECTAL SURGERY

## 2023-03-16 PROCEDURE — 45398 COLONOSCOPY W/BAND LIGATION: CPT | Performed by: COLON & RECTAL SURGERY

## 2023-03-16 PROCEDURE — 25010000002 PROPOFOL 10 MG/ML EMULSION: Performed by: ANESTHESIOLOGY

## 2023-03-16 RX ORDER — SODIUM CHLORIDE, SODIUM LACTATE, POTASSIUM CHLORIDE, CALCIUM CHLORIDE 600; 310; 30; 20 MG/100ML; MG/100ML; MG/100ML; MG/100ML
30 INJECTION, SOLUTION INTRAVENOUS CONTINUOUS PRN
Status: DISCONTINUED | OUTPATIENT
Start: 2023-03-16 | End: 2023-03-16 | Stop reason: HOSPADM

## 2023-03-16 RX ORDER — BUPIVACAINE HYDROCHLORIDE AND EPINEPHRINE 2.5; 5 MG/ML; UG/ML
INJECTION, SOLUTION EPIDURAL; INFILTRATION; INTRACAUDAL; PERINEURAL AS NEEDED
Status: DISCONTINUED | OUTPATIENT
Start: 2023-03-16 | End: 2023-03-16 | Stop reason: HOSPADM

## 2023-03-16 RX ORDER — TRAMADOL HYDROCHLORIDE 50 MG/1
TABLET ORAL
Qty: 16 TABLET | Refills: 0 | Status: SHIPPED | OUTPATIENT
Start: 2023-03-16

## 2023-03-16 RX ORDER — KETOROLAC TROMETHAMINE 30 MG/ML
INJECTION, SOLUTION INTRAMUSCULAR; INTRAVENOUS AS NEEDED
Status: DISCONTINUED | OUTPATIENT
Start: 2023-03-16 | End: 2023-03-16 | Stop reason: SURG

## 2023-03-16 RX ORDER — BIOTIN 10000 MCG
CAPSULE ORAL
COMMUNITY

## 2023-03-16 RX ORDER — PROPOFOL 10 MG/ML
VIAL (ML) INTRAVENOUS AS NEEDED
Status: DISCONTINUED | OUTPATIENT
Start: 2023-03-16 | End: 2023-03-16 | Stop reason: SURG

## 2023-03-16 RX ADMIN — PROPOFOL 140 MCG/KG/MIN: 10 INJECTION, EMULSION INTRAVENOUS at 15:43

## 2023-03-16 RX ADMIN — PROPOFOL 100 MG: 10 INJECTION, EMULSION INTRAVENOUS at 15:43

## 2023-03-16 RX ADMIN — KETOROLAC TROMETHAMINE 30 MG: 30 INJECTION, SOLUTION INTRAMUSCULAR; INTRAVENOUS at 16:07

## 2023-03-16 RX ADMIN — SODIUM CHLORIDE, POTASSIUM CHLORIDE, SODIUM LACTATE AND CALCIUM CHLORIDE 30 ML/HR: 600; 310; 30; 20 INJECTION, SOLUTION INTRAVENOUS at 14:40

## 2023-03-16 NOTE — ANESTHESIA PREPROCEDURE EVALUATION
Anesthesia Evaluation     Patient summary reviewed and Nursing notes reviewed   NPO Solid Status: > 8 hours  NPO Liquid Status: > 2 hours           Airway   Mallampati: II  TM distance: >3 FB  Neck ROM: full  No difficulty expected  Dental - normal exam     Pulmonary - negative pulmonary ROS and normal exam   Cardiovascular - normal exam  Exercise tolerance: good (4-7 METS)    (+) hyperlipidemia,       Neuro/Psych- negative ROS  GI/Hepatic/Renal/Endo - negative ROS     Musculoskeletal     Abdominal    Substance History - negative use     OB/GYN negative ob/gyn ROS         Other   arthritis,                      Anesthesia Plan    ASA 2     MAC     intravenous induction     Anesthetic plan, risks, benefits, and alternatives have been provided, discussed and informed consent has been obtained with: patient.        CODE STATUS:

## 2023-03-16 NOTE — ANESTHESIA POSTPROCEDURE EVALUATION
"Patient: Dorinda Elder    Procedure Summary     Date: 03/16/23 Room / Location:  [facility]    Anesthesia Start: 1537 Anesthesia Stop: 1616    Procedures:       HEMORRHOID BANDINGx3 (Rectum)      COLONOSCOPY to cecum with hot snare polypectomy Diagnosis:       Rectal bleed      Internal hemorrhoids with complication      (Rectal bleed [K62.5])      (Internal hemorrhoids with complication [K64.8])    Surgeons: Earl Sow MD Provider: Oscar Holliday MD    Anesthesia Type: MAC ASA Status: 2          Anesthesia Type: MAC    Vitals  No vitals data found for the desired time range.          Post Anesthesia Care and Evaluation    Patient location during evaluation: bedside  Patient participation: complete - patient participated  Level of consciousness: awake and alert  Pain management: adequate    Airway patency: patent  Anesthetic complications: No anesthetic complications  PONV Status: controlled  Cardiovascular status: acceptable  Respiratory status: acceptable  Hydration status: acceptable    Comments: /68 (BP Location: Left arm, Patient Position: Lying)   Pulse 70   Resp 14   Ht 165.1 cm (65\")   Wt 67.6 kg (149 lb)   LMP  (LMP Unknown)   SpO2 96%   BMI 24.79 kg/m²         "

## 2023-03-16 NOTE — DISCHARGE INSTRUCTIONS
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
## 2023-03-20 LAB
LAB AP CASE REPORT: NORMAL
PATH REPORT.FINAL DX SPEC: NORMAL
PATH REPORT.GROSS SPEC: NORMAL

## 2023-03-21 ENCOUNTER — OFFICE VISIT (OUTPATIENT)
Dept: ORTHOPEDIC SURGERY | Facility: CLINIC | Age: 78
End: 2023-03-21
Payer: MEDICARE

## 2023-03-21 VITALS — WEIGHT: 154.2 LBS | HEIGHT: 65 IN | BODY MASS INDEX: 25.69 KG/M2 | TEMPERATURE: 97.3 F

## 2023-03-21 DIAGNOSIS — R52 PAIN: Primary | ICD-10-CM

## 2023-03-21 PROCEDURE — 73562 X-RAY EXAM OF KNEE 3: CPT | Performed by: ORTHOPAEDIC SURGERY

## 2023-03-21 PROCEDURE — 1160F RVW MEDS BY RX/DR IN RCRD: CPT | Performed by: ORTHOPAEDIC SURGERY

## 2023-03-21 PROCEDURE — 99213 OFFICE O/P EST LOW 20 MIN: CPT | Performed by: ORTHOPAEDIC SURGERY

## 2023-03-21 PROCEDURE — 1159F MED LIST DOCD IN RCRD: CPT | Performed by: ORTHOPAEDIC SURGERY

## 2023-03-21 NOTE — PROGRESS NOTES
"Patient: Dorinda Elder  YOB: 1945 78 y.o. female  Medical Record Number: [id number]    Chief Complaints:   Chief Complaint   Patient presents with   • Left Knee - Follow-up       History of Present Illness:Dorinda Elder is a 78 y.o. female who presents with bilateral lateral knee pain and achiness.  She still fairly active she recently was on a trip [place] and she was walking about 6 to 7 miles a day but did have discomfort.  She now does Pilates at home and she does not hurt with Pilates she has some soreness with extremes of activity.    Allergies: No Known Allergies    Medications:   Current Outpatient Medications   Medication Sig Dispense Refill   • acyclovir (ZOVIRAX) 400 MG tablet Take 1 tablet by mouth 2 (Two) Times a Day As Needed.  3   • Biotin 10 MG capsule Take  by mouth.     • Calcium 500 MG tablet Take 600 mg by mouth.     • Cholecalciferol (VITAMIN D3) 2000 UNITS tablet Take 1 tablet by mouth Daily.     • Hydrocortisone, Perianal, (Anusol-HC) 2.5 % rectal cream Apply rectally 3 times daily.  Include applicator. 30 g 1   • traMADol (Ultram) 50 MG tablet 1-2 tabs po q 6 hrs prn pain 16 tablet 0     No current facility-administered medications for this visit.         The following portions of the patient's history were reviewed and updated as appropriate: allergies, current medications, past family history, past medical history, past social history, past surgical history and problem list.    Review of Systems:   Pertinent positives/negatives listed in HPI above    Physical Exam:   Vitals:    03/21/23 1536   Temp: 97.3 °F (36.3 °C)   TempSrc: Temporal   Weight: 69.9 kg (154 lb 3.2 oz)   Height: 165.1 cm (65\")   PainSc:   6   PainLoc: Knee       General: A and O x 3, ASA, NAD      Knee Exam List: Knee:  bilateral    ALIGNMENT:     Valgus      GAIT:    Antalgic    SKIN:    No abnormality    RANGE OF MOTION:   5  -  120   DEG    STRENGTH:   4  / 5    LIGAMENTS:    No varus / valgus " instability.   Negative  Lachman.    MENISCUS:     Negative   Samantha       DISTAL PULSES:    Paplable    DISTAL SENSATION :   Intact    LYMPHATICS:     No   lymphadenopathy    OTHER:          - Positive   effusion      - Crepitance with ROM        Radiology:  Xrays 3views left knee (ap,lateral, sunrise) were ordered and reviewed for evaluation of knee pain demonstrating moderately severe to severe advanced valgus osteoarthritis with bone on bone articulation, subchondral cysts, and periarticular osteophytes.  I compared them to previous films from 2019 and there has been progression of the narrowing of the lateral joint space.  There is also noted osteopenia of both knees.    Assessment/Plan: Left greater than right knee valgus osteoarthritis she is not at the point yet where she wants to consider knee replacement.  I want her to continue quad strengthening exercises.  I also discussed measures to help combat osteoporosis including calcium vitamin D and weightbearing exercise.  She should follow-up with her primary care physician for regular bone health exams including DEXA scanning per schedule      Diagnoses and all orders for this visit:    1. Pain (Primary)  -     XR Knee 3 View Left         Stefan Zavala MD  3/21/2023

## 2023-10-23 ENCOUNTER — OFFICE VISIT (OUTPATIENT)
Dept: ORTHOPEDIC SURGERY | Facility: CLINIC | Age: 78
End: 2023-10-23
Payer: MEDICARE

## 2023-10-23 VITALS — HEIGHT: 65 IN | TEMPERATURE: 97.5 F | BODY MASS INDEX: 24.66 KG/M2 | WEIGHT: 148 LBS

## 2023-10-23 DIAGNOSIS — M17.0 PRIMARY OSTEOARTHRITIS OF BOTH KNEES: ICD-10-CM

## 2023-10-23 DIAGNOSIS — R52 PAIN: Primary | ICD-10-CM

## 2023-10-23 PROCEDURE — 1159F MED LIST DOCD IN RCRD: CPT | Performed by: NURSE PRACTITIONER

## 2023-10-23 PROCEDURE — 73562 X-RAY EXAM OF KNEE 3: CPT | Performed by: NURSE PRACTITIONER

## 2023-10-23 PROCEDURE — 1160F RVW MEDS BY RX/DR IN RCRD: CPT | Performed by: NURSE PRACTITIONER

## 2023-10-23 PROCEDURE — 20610 DRAIN/INJ JOINT/BURSA W/O US: CPT | Performed by: NURSE PRACTITIONER

## 2023-10-23 PROCEDURE — 99214 OFFICE O/P EST MOD 30 MIN: CPT | Performed by: NURSE PRACTITIONER

## 2023-10-23 RX ORDER — METHYLPREDNISOLONE ACETATE 80 MG/ML
80 INJECTION, SUSPENSION INTRA-ARTICULAR; INTRALESIONAL; INTRAMUSCULAR; SOFT TISSUE
Status: COMPLETED | OUTPATIENT
Start: 2023-10-23 | End: 2023-10-23

## 2023-10-23 RX ADMIN — METHYLPREDNISOLONE ACETATE 80 MG: 80 INJECTION, SUSPENSION INTRA-ARTICULAR; INTRALESIONAL; INTRAMUSCULAR; SOFT TISSUE at 09:48

## 2023-10-23 NOTE — PROGRESS NOTES
Patient: Dorinda Elder  YOB: 1945 78 y.o. female  Medical Record Number: [id number]    Chief Complaints:   Chief Complaint   Patient presents with   • Right Knee - Initial Evaluation       History of Present Illness:Dorinda Elder is a 78 y.o. female who presents with complaints of increased right knee pain, the patient unfortunately has bone-on-bone end-stage osteoarthritis, we have seen her previously for her knees reports that on 1019 she injured her right knee, her dog backed up on her she had a cute onset of pain which has persisted since.  Patient reports bilateral knee pain is worse with increased activity, only slightly better with rest.  She has tried over-the-counter medications with minimal improvement    Allergies: No Known Allergies    Medications:   Current Outpatient Medications   Medication Sig Dispense Refill   • Biotin 10 MG capsule Take  by mouth.     • Calcium 500 MG tablet Take 600 mg by mouth.     • Cholecalciferol (VITAMIN D3) 2000 UNITS tablet Take 1 tablet by mouth Daily.     • acyclovir (ZOVIRAX) 400 MG tablet Take 1 tablet by mouth 2 (Two) Times a Day As Needed. (Patient not taking: Reported on 10/23/2023)  3   • azithromycin (Zithromax Z-Justin) 250 MG tablet Take 2 tablets the first day, then 1 tablet daily for 4 days. (Patient not taking: Reported on 10/23/2023) 6 tablet 0   • benzonatate (TESSALON) 200 MG capsule Take 1 capsule by mouth 3 (Three) Times a Day As Needed for Cough. (Patient not taking: Reported on 10/23/2023) 30 capsule 0   • Hydrocortisone, Perianal, (Anusol-HC) 2.5 % rectal cream Apply rectally 3 times daily.  Include applicator. (Patient not taking: Reported on 10/23/2023) 30 g 1   • methylPREDNISolone (MEDROL) 4 MG dose pack Take as directed on package instructions. (Patient not taking: Reported on 10/23/2023) 21 tablet 0     No current facility-administered medications for this visit.         The following portions of the patient's history were  "reviewed and updated as appropriate: allergies, current medications, past family history, past medical history, past social history, past surgical history and problem list.    Review of Systems:   14 point review of systems was performed. All systems negative except pertinent positives/negatives listed in HPI above    Physical Exam:   Vitals:    10/23/23 0858   Temp: 97.5 °F (36.4 °C)   TempSrc: Temporal   Weight: 67.1 kg (148 lb)   Height: 165.1 cm (65\")   PainSc:   5   PainLoc: Knee       General: A and O x 3, ASA, NAD   Skin clear no unusual lesions noted  Bilateral knees patient has trace amount of effusion noted bilaterally with 110 degrees flexion neutral in extension with a positive Samantha negative Lockman calf soft and nontender       Radiology:  Xrays 3views (ap,lateral, sunrise) bilateral knees were ordered and reviewed today secondary to pain and show bone-on-bone end-stage osteoarthritis with cyst and spur formation.  Comparative views are unchanged    Assessment/Plan: End-stage osteoarthritis bilateral knees with increased right knee pain secondary to injury    Patient and I discussed options, we will proceed with bilateral knee cortisone injections, continue physical therapy exercises, prescription given for ketoprofen lidocaine cream from [facility] to be used as needed, and I will see her back for follow-up, if for some reason her pain does not improve we may need to move forward with total knee replacement    Large Joint Arthrocentesis: R knee  Date/Time: 10/23/2023 9:48 AM  Consent given by: patient  Site marked: site marked  Timeout: Immediately prior to procedure a time out was called to verify the correct patient, procedure, equipment, support staff and site/side marked as required   Supporting Documentation  Indications: pain and joint swelling   Procedure Details  Location: knee - R knee  Preparation: Patient was prepped and draped in the usual sterile fashion  Needle size: 22 G  Approach: " anterolateral  Medications administered: 80 mg methylPREDNISolone acetate 80 MG/ML; 2 mL lidocaine (cardiac)  Patient tolerance: patient tolerated the procedure well with no immediate complications      Large Joint Arthrocentesis: L knee  Date/Time: 10/23/2023 9:48 AM  Consent given by: patient  Site marked: site marked  Timeout: Immediately prior to procedure a time out was called to verify the correct patient, procedure, equipment, support staff and site/side marked as required   Supporting Documentation  Indications: pain and joint swelling   Procedure Details  Location: knee - L knee  Preparation: Patient was prepped and draped in the usual sterile fashion  Needle size: 22 G  Approach: anterolateral  Medications administered: 80 mg methylPREDNISolone acetate 80 MG/ML; 2 mL lidocaine (cardiac)  Patient tolerance: patient tolerated the procedure well with no immediate complications           Venus Mendez, APRN  10/23/2023

## 2023-12-14 ENCOUNTER — TELEPHONE (OUTPATIENT)
Dept: ORTHOPEDIC SURGERY | Facility: CLINIC | Age: 78
End: 2023-12-14
Payer: MEDICARE

## 2023-12-14 NOTE — TELEPHONE ENCOUNTER
"Spoke to patient, she stated the injections didn't help at all, states she got no relief and she was reading online about \"the platelet thing\" and wanted to try that.     Please review and advise         "

## 2023-12-14 NOTE — TELEPHONE ENCOUNTER
Provider: BROWN     Caller: PATIENT    Phone Number: [phone]    Reason for Call: PATIENT STATES THAT THE CORTISONE INJECTIONS HAVEN'T HELPED AT ALL FOR HER KNEES. SHE WOULD LIKE TO HAVE PRP INJECTIONS IF POSSIBLE. PLEASE CALL HER TO DISCUSS.

## 2023-12-15 NOTE — TELEPHONE ENCOUNTER
Patient returned my call.  We discussed the PRP injection and all questions were answered.  Patient is aware of the cost of $400.  She would like to go ahead and schedule the PRP however would like to do it sometime in February.  Injection set up for February 15

## 2023-12-15 NOTE — TELEPHONE ENCOUNTER
Call returned to the patient however was unable to reach her, but did leave a message on her answering machine for her to contact me at the office

## 2024-02-13 ENCOUNTER — TELEPHONE (OUTPATIENT)
Dept: ORTHOPEDIC SURGERY | Facility: CLINIC | Age: 79
End: 2024-02-13

## 2024-02-13 NOTE — TELEPHONE ENCOUNTER
Hub staff attempted to follow warm transfer process and was unsuccessful     Caller: Dorinda Elder    Relationship to patient: Self    Best call back number: [phone] (home)       Patient is needing: CALLBACK TO R/S 12.15.24 PRP INJECTION; SHE NEEDS TO GO OUT OF TOWN FOR FAMILY EMERGENCY

## 2024-03-04 ENCOUNTER — TELEPHONE (OUTPATIENT)
Dept: ORTHOPEDIC SURGERY | Facility: CLINIC | Age: 79
End: 2024-03-04
Payer: MEDICARE

## 2024-03-04 NOTE — TELEPHONE ENCOUNTER
Spoke with patient, relayed ok to keep injection/PRP appointment while on a blood thinner per Dr Zavala, patient verbalized understanding

## 2024-03-04 NOTE — TELEPHONE ENCOUNTER
Please review and advise. Temporary blood thinner RX not in patient chart, could be from [facility]

## 2024-03-04 NOTE — TELEPHONE ENCOUNTER
Provider:  DR. CRISTOFER DURAN    Caller: ALDA MARTINEZ    Relationship to Patient: SELF      Phone Number: [phone]    Reason for Call: PATIENT IS SCHEDULED FOR A LEFT KNEE PRP INJECTION ON 3/21/24. PATIENT JUST HAD AN INJECTION FOR A VARICOSE VEIN AND WAS TEMPORARILY PUT ON BLOOD THINNER. SHE WILL STILL BE ON BLOOD THINNER AT TIME OF PRP INJECTION AND WANTS TO KNOW IF THAT'S OK. PATIENT COULD NOT REMEMBER NAME OF RX.    When was the patient last seen: 10/23/23

## 2024-03-18 ENCOUNTER — TELEPHONE (OUTPATIENT)
Dept: ORTHOPEDIC SURGERY | Facility: CLINIC | Age: 79
End: 2024-03-18
Payer: MEDICARE

## 2024-03-18 NOTE — TELEPHONE ENCOUNTER
Provider:      Caller: ALDA MARTINEZ     Relationship to Patient: SELF     Phone Number: [phone]    Reason for Call: PATIENT IS WANTING TO KNOW THE RECOVERY TIME AFTER AN INJECTION AND HOW LONG SHE WILL BE OUT.

## 2024-03-19 NOTE — TELEPHONE ENCOUNTER
"Left voicemail for patient regarding upcoming injection.     Relay     \"Please inform patient there is minimal down time with a PRP injection, she will need to take it easy the day of her injection, she can use ice for 20 minutes at a time after injection if it bothers her. Resume normal activities the next day, no driving or physical restrictions\"                  "

## 2024-03-19 NOTE — TELEPHONE ENCOUNTER
Not sure what she means by recovery time after an injection.  The PRP injection is no different than a cortisone injection as far as expectations for recovery.

## 2024-03-20 ENCOUNTER — TELEPHONE (OUTPATIENT)
Dept: ORTHOPEDIC SURGERY | Facility: CLINIC | Age: 79
End: 2024-03-20

## 2024-03-20 NOTE — TELEPHONE ENCOUNTER
Hub staff attempted to follow warm transfer process and was unsuccessful     Caller: Dorinda Elder    Relationship to patient: Self    Best call back number: [phone]    Patient is needing: PATIENT NEEDS TO RESCHEDULE PRP INJECTION DATED 03/21- PLEASE REACH OUT AND ADVISE

## 2024-03-21 ENCOUNTER — HOSPITAL ENCOUNTER (OUTPATIENT)
Facility: HOSPITAL | Age: 79
Discharge: HOME | End: 2024-03-21
Payer: MEDICARE

## 2024-03-21 DIAGNOSIS — I80.232: ICD-10-CM

## 2024-03-21 LAB
BH CV LOW VAS LEFT EXTERNAL ILIAC AUGMENT: NORMAL
BH CV LOW VAS LEFT EXTERNAL ILIAC COMPRESS: NORMAL
BH CV LOW VAS LEFT GASTRONEMIUS VESSEL: 1
BH CV LOW VAS LEFT LESSER SAPH VESSEL: 1
BH CV LOWER VASCULAR LEFT COMMON FEMORAL AUGMENT: NORMAL
BH CV LOWER VASCULAR LEFT COMMON FEMORAL COMPETENT: NORMAL
BH CV LOWER VASCULAR LEFT COMMON FEMORAL COMPRESS: NORMAL
BH CV LOWER VASCULAR LEFT COMMON FEMORAL PHASIC: NORMAL
BH CV LOWER VASCULAR LEFT COMMON FEMORAL SPONT: NORMAL
BH CV LOWER VASCULAR LEFT DISTAL FEMORAL COMPRESS: NORMAL
BH CV LOWER VASCULAR LEFT EXTERNAL ILIAC COMPETENT: NORMAL
BH CV LOWER VASCULAR LEFT EXTERNAL ILIAC PHASIC: NORMAL
BH CV LOWER VASCULAR LEFT EXTERNAL ILIAC SPONT: NORMAL
BH CV LOWER VASCULAR LEFT GASTRONEMIUS COMPRESS: NORMAL
BH CV LOWER VASCULAR LEFT GASTRONEMIUS THROMBUS: NORMAL
BH CV LOWER VASCULAR LEFT GREATER SAPH AK COMPRESS: NORMAL
BH CV LOWER VASCULAR LEFT GREATER SAPH BK COMPRESS: NORMAL
BH CV LOWER VASCULAR LEFT LESSER SAPH COMPRESS: NORMAL
BH CV LOWER VASCULAR LEFT LESSER SAPH THROMBUS: NORMAL
BH CV LOWER VASCULAR LEFT MID FEMORAL AUGMENT: NORMAL
BH CV LOWER VASCULAR LEFT MID FEMORAL COMPETENT: NORMAL
BH CV LOWER VASCULAR LEFT MID FEMORAL COMPRESS: NORMAL
BH CV LOWER VASCULAR LEFT MID FEMORAL PHASIC: NORMAL
BH CV LOWER VASCULAR LEFT MID FEMORAL SPONT: NORMAL
BH CV LOWER VASCULAR LEFT PERFORATOR COMPRESS: NORMAL
BH CV LOWER VASCULAR LEFT PERFORATOR THROMBUS: NORMAL
BH CV LOWER VASCULAR LEFT PERFORATOR VESSEL: 1
BH CV LOWER VASCULAR LEFT PERONEAL COMPRESS: NORMAL
BH CV LOWER VASCULAR LEFT POPLITEAL AUGMENT: NORMAL
BH CV LOWER VASCULAR LEFT POPLITEAL COMPETENT: NORMAL
BH CV LOWER VASCULAR LEFT POPLITEAL COMPRESS: NORMAL
BH CV LOWER VASCULAR LEFT POPLITEAL PHASIC: NORMAL
BH CV LOWER VASCULAR LEFT POPLITEAL SPONT: NORMAL
BH CV LOWER VASCULAR LEFT POSTERIOR TIBIAL AUGMENT: NORMAL
BH CV LOWER VASCULAR LEFT POSTERIOR TIBIAL COMPRESS: NORMAL
BH CV LOWER VASCULAR LEFT PROFUNDA FEMORAL COMPRESS: NORMAL
BH CV LOWER VASCULAR LEFT PROXIMAL FEMORAL COMPRESS: NORMAL
BH CV LOWER VASCULAR LEFT SAPHENOFEMORAL JUNCTION COMPRESS: NORMAL
BH CV LOWER VASCULAR RIGHT COMMON FEMORAL AUGMENT: NORMAL
BH CV LOWER VASCULAR RIGHT COMMON FEMORAL COMPETENT: NORMAL
BH CV LOWER VASCULAR RIGHT COMMON FEMORAL COMPRESS: NORMAL
BH CV LOWER VASCULAR RIGHT COMMON FEMORAL PHASIC: NORMAL
BH CV LOWER VASCULAR RIGHT COMMON FEMORAL SPONT: NORMAL

## 2024-03-21 PROCEDURE — 93971 EXTREMITY STUDY: CPT

## 2024-03-22 ENCOUNTER — OFFICE VISIT (OUTPATIENT)
Age: 79
End: 2024-03-22
Payer: MEDICARE

## 2024-03-22 VITALS
HEIGHT: 65 IN | SYSTOLIC BLOOD PRESSURE: 120 MMHG | DIASTOLIC BLOOD PRESSURE: 72 MMHG | WEIGHT: 145 LBS | BODY MASS INDEX: 24.16 KG/M2

## 2024-03-22 DIAGNOSIS — I82.462 ACUTE DEEP VEIN THROMBOSIS (DVT) OF CALF MUSCLE VEIN OF LEFT LOWER EXTREMITY: Primary | ICD-10-CM
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
## 2024-03-22 NOTE — ASSESSMENT & PLAN NOTE
Dorinda Elder is a 79 y.o. female patient being followed in our office by Dr. Dr. Valero for venous insufficiency with symptomatic varicose veins. She is status post Varithena of the left lower extremity tributary varicose veins performed on 02/27/2024.  Follow-up spotcheck on 2/29/2024 demonstrated successful ablation with acute left gastroc thrombosis.  She was started on Eliquis 5 mg twice daily.  She returns today in a 4-week follow-up along with left lower extremity venous duplex scan. She reports she has been doing satisfactory with no significant complaints, other than occasional headaches presumably from the Eliquis.  She reports this is tolerable.  She is not having any pain or swelling to her left leg.    LEFT lower extremity venous duplex scan done in the office today demonstrates successful ablation with subacute thrombosis in the left gastroc vein.     We have  reviewed the results of the ultrasound scan today.  I have explained the pathophysiology of deep vein thrombosis from acute to subacute to chronic.  Because she is still subacute, we will continue Eliquis 5 mg twice daily.  I have given her samples today.  She is to return in a 4-week follow-up along with left lower extremity venous duplex scan.

## 2024-04-15 PROBLEM — I87.2 VENOUS INSUFFICIENCY: Status: ACTIVE | Noted: 2024-04-15

## 2024-04-15 PROBLEM — R60.0 EDEMA OF BOTH LOWER EXTREMITIES: Status: ACTIVE | Noted: 2024-04-15

## 2024-04-15 PROBLEM — I83.93 SPIDER VEINS OF BOTH LOWER EXTREMITIES: Status: ACTIVE | Noted: 2024-04-15

## 2024-04-15 PROBLEM — J30.2 SEASONAL ALLERGIES: Status: ACTIVE | Noted: 2024-04-15

## 2024-04-15 PROBLEM — I83.812 VARICOSE VEINS OF LEFT LOWER EXTREMITY WITH PAIN: Status: ACTIVE | Noted: 2024-04-15

## 2024-04-22 ENCOUNTER — HOSPITAL ENCOUNTER (OUTPATIENT)
Facility: HOSPITAL | Age: 79
Discharge: HOME | End: 2024-04-22
Admitting: NURSE PRACTITIONER
Payer: MEDICARE

## 2024-04-22 ENCOUNTER — OFFICE VISIT (OUTPATIENT)
Age: 79
End: 2024-04-22
Payer: MEDICARE

## 2024-04-22 VITALS
SYSTOLIC BLOOD PRESSURE: 118 MMHG | DIASTOLIC BLOOD PRESSURE: 74 MMHG | BODY MASS INDEX: 24.16 KG/M2 | HEIGHT: 65 IN | WEIGHT: 145 LBS

## 2024-04-22 DIAGNOSIS — I82.562 CHRONIC DEEP VEIN THROMBOSIS (DVT) OF CALF MUSCLE VEIN OF LEFT LOWER EXTREMITY: Primary | ICD-10-CM

## 2024-04-22 DIAGNOSIS — I82.462 ACUTE DEEP VEIN THROMBOSIS (DVT) OF CALF MUSCLE VEIN OF LEFT LOWER EXTREMITY: ICD-10-CM

## 2024-04-22 LAB
BH CV LOW VAS LEFT EXTERNAL ILIAC AUGMENT: NORMAL
BH CV LOW VAS LEFT EXTERNAL ILIAC COMPRESS: NORMAL
BH CV LOW VAS LEFT GASTRONEMIUS VESSEL: 1
BH CV LOW VAS LEFT GREATER SAPH AK VESSEL: 1
BH CV LOW VAS LEFT GREATER SAPH BK VESSEL: 1
BH CV LOW VAS LEFT LESSER SAPH VESSEL: 1
BH CV LOW VAS LEFT VARICOSITY AK VESSEL: 1
BH CV LOW VAS LEFT VARICOSITY BK VESSEL: 1
BH CV LOWER VASCULAR LEFT COMMON FEMORAL AUGMENT: NORMAL
BH CV LOWER VASCULAR LEFT COMMON FEMORAL COMPRESS: NORMAL
BH CV LOWER VASCULAR LEFT COMMON FEMORAL PHASIC: NORMAL
BH CV LOWER VASCULAR LEFT COMMON FEMORAL SPONT: NORMAL
BH CV LOWER VASCULAR LEFT DISTAL FEMORAL COMPRESS: NORMAL
BH CV LOWER VASCULAR LEFT EXTERNAL ILIAC PHASIC: NORMAL
BH CV LOWER VASCULAR LEFT EXTERNAL ILIAC SPONT: NORMAL
BH CV LOWER VASCULAR LEFT GASTRONEMIUS COMPRESS: NORMAL
BH CV LOWER VASCULAR LEFT GASTRONEMIUS THROMBUS: NORMAL
BH CV LOWER VASCULAR LEFT GREATER SAPH AK COMPRESS: NORMAL
BH CV LOWER VASCULAR LEFT GREATER SAPH BK COMPRESS: NORMAL
BH CV LOWER VASCULAR LEFT LESSER SAPH COMPRESS: NORMAL
BH CV LOWER VASCULAR LEFT MID FEMORAL COMPRESS: NORMAL
BH CV LOWER VASCULAR LEFT PERONEAL COMPRESS: NORMAL
BH CV LOWER VASCULAR LEFT POPLITEAL AUGMENT: NORMAL
BH CV LOWER VASCULAR LEFT POPLITEAL COMPETENT: NORMAL
BH CV LOWER VASCULAR LEFT POPLITEAL COMPRESS: NORMAL
BH CV LOWER VASCULAR LEFT POSTERIOR TIBIAL COMPRESS: NORMAL
BH CV LOWER VASCULAR LEFT PROFUNDA FEMORAL COMPRESS: NORMAL
BH CV LOWER VASCULAR LEFT PROXIMAL FEMORAL COMPRESS: NORMAL
BH CV LOWER VASCULAR LEFT SAPHENOFEMORAL JUNCTION COMPRESS: NORMAL
BH CV LOWER VASCULAR LEFT SOLEAL COMPRESS: NORMAL
BH CV LOWER VASCULAR LEFT VARICOSITY AK COMPRESS: NORMAL
BH CV LOWER VASCULAR LEFT VARICOSITY BK COMPRESS: NORMAL
BH CV LOWER VASCULAR RIGHT COMMON FEMORAL AUGMENT: NORMAL
BH CV LOWER VASCULAR RIGHT COMMON FEMORAL COMPETENT: NORMAL
BH CV LOWER VASCULAR RIGHT COMMON FEMORAL COMPRESS: NORMAL
BH CV LOWER VASCULAR RIGHT COMMON FEMORAL PHASIC: NORMAL
BH CV LOWER VASCULAR RIGHT COMMON FEMORAL SPONT: NORMAL

## 2024-04-22 PROCEDURE — 93971 EXTREMITY STUDY: CPT | Performed by: SURGERY

## 2024-04-22 PROCEDURE — 93971 EXTREMITY STUDY: CPT

## 2024-04-22 PROCEDURE — 1159F MED LIST DOCD IN RCRD: CPT | Performed by: NURSE PRACTITIONER

## 2024-04-22 PROCEDURE — 1160F RVW MEDS BY RX/DR IN RCRD: CPT | Performed by: NURSE PRACTITIONER

## 2024-04-22 PROCEDURE — 99213 OFFICE O/P EST LOW 20 MIN: CPT | Performed by: NURSE PRACTITIONER

## 2024-04-22 NOTE — ASSESSMENT & PLAN NOTE
Assessment & Plan:  Dorinda Elder is a 79 y.o. female patient being followed in our office by Dr. Dr. Valero for venous insufficiency with symptomatic varicose veins. She is status post Varithena of the left lower extremity tributary varicose veins performed on 02/27/2024.  Follow-up spotcheck on 2/29/2024 demonstrated successful ablation with acute left gastroc thrombosis.  She was started on Eliquis 5 mg twice daily.  He had a left lower extremity venous duplex scan on 3/22/2024 which identified the thrombus as subacute.  She remained on the Eliquis.  She returns today in a follow-up along with left lower extremity venous duplex scan. She reports she has been doing satisfactory with no significant complaints, other than occasional headaches presumably from the Eliquis.  She reports this is tolerable.  She is not having any pain or swelling to her left leg.    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     We have  reviewed the results of the ultrasound scan today.  I have explained the pathophysiology of deep vein thrombosis from acute to subacute to chronic.  Because the thrombus is now chronic, we will discontinue Eliquis.  We will see her on an as-needed basis.  She is planning to schedule injection sclerotherapy in the fall.
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
## 2024-04-30 ENCOUNTER — CLINICAL SUPPORT (OUTPATIENT)
Dept: ORTHOPEDIC SURGERY | Facility: CLINIC | Age: 79
End: 2024-04-30

## 2024-04-30 VITALS — HEIGHT: 65 IN | WEIGHT: 153.7 LBS | BODY MASS INDEX: 25.61 KG/M2 | TEMPERATURE: 97.1 F

## 2024-04-30 PROCEDURE — PRPACP: Performed by: ORTHOPAEDIC SURGERY

## 2024-05-13 ENCOUNTER — TELEPHONE (OUTPATIENT)
Dept: ORTHOPEDIC SURGERY | Facility: CLINIC | Age: 79
End: 2024-05-13

## 2024-05-13 NOTE — TELEPHONE ENCOUNTER
Caller: Dorinda Elder    Relationship to patient: Self    Best call back number: [phone] (home)     Patient is needing: PATIENT WANTS TO GET PRP INJECTION IN RIGHT KNEE WITH DR DURAN,  HAD ONE IN THE LEFT KNEE ON 4/30/24, BUT NEVER BEFORE IN THE RIGHT KNEE.   PLEASE ADVISE

## 2024-07-11 ENCOUNTER — TELEPHONE (OUTPATIENT)
Dept: ORTHOPEDIC SURGERY | Facility: CLINIC | Age: 79
End: 2024-07-11

## 2024-07-11 NOTE — TELEPHONE ENCOUNTER
Caller: Dorinda Elder    Relationship to patient: Self    Best call back number: [phone]    Additional notes:PATIENT NEEDS TO CANCEL HER PRP INJECTION ON 07/18. SHE WILL CALL BACK TO R/S.

## 2024-11-27 ENCOUNTER — TELEPHONE (OUTPATIENT)
Dept: SPORTS MEDICINE | Facility: CLINIC | Age: 79
End: 2024-11-27
Payer: MEDICARE

## 2024-11-27 NOTE — TELEPHONE ENCOUNTER
Patient states she will definitely take a sooner appt at [facility] if you can get her in sooner

## 2024-11-27 NOTE — TELEPHONE ENCOUNTER
Caller: ALDA    Relationship: SELF    Best call back number: [phone]    What orders are you requesting (i.e. lab or imaging): WOULD LIKE TO SEE IF SHE COULD  GET AN ORDER FOR PHYSICAL THERAPY RIGHT ANKLE- HER APPT IS 12/18/24- WOULD LIKE TO GET STARTED- ORDER NEEDS TO GOT TO [facility]- PLEASE CALL

## 2024-12-03 ENCOUNTER — OFFICE VISIT (OUTPATIENT)
Dept: SPORTS MEDICINE | Facility: CLINIC | Age: 79
End: 2024-12-03
Payer: MEDICARE

## 2024-12-03 VITALS
BODY MASS INDEX: 25.49 KG/M2 | HEIGHT: 65 IN | OXYGEN SATURATION: 96 % | DIASTOLIC BLOOD PRESSURE: 68 MMHG | HEART RATE: 84 BPM | WEIGHT: 153 LBS | SYSTOLIC BLOOD PRESSURE: 116 MMHG

## 2024-12-03 DIAGNOSIS — S82.831A TRAUMATIC CLOSED NONDISPLACED FRACTURE OF DISTAL FIBULA, RIGHT, INITIAL ENCOUNTER: ICD-10-CM

## 2024-12-03 DIAGNOSIS — M25.571 ACUTE RIGHT ANKLE PAIN: Primary | ICD-10-CM

## 2024-12-03 NOTE — PROGRESS NOTES
Chief Complaint   Patient presents with    Right Ankle - Initial Evaluation       History of Present Illness  Dorinda is a 79 y.o. year old female here today for right ankle pain that has been present for the past week and half after one of her dogs ran in to her.  History of Present Illness  She recounts an incident where one of her dogs collided with her ankle last Thursday. Despite the initial lack of pain, she experienced difficulty walking by Friday afternoon. Pain is rated 6/10 and is located on the lateral aspect. The swelling, which was initially minimal, has since subsided. However, she continues to experience pain in the area of impact. She is uncertain if the pain is due to a hairline fracture or a tendon issue. Her mobility is currently limited, necessitating the use of a cane. She has a history of frequent ankle sprains. She has not been taking any pain medication.    She also has arthritis in her left knee and is considering stem cell treatment. She has seen Dr. Zavala for her right knee. Additionally, she had a DEXA scan done on Friday. She takes vitamin D and calcium supplements.       The following data was reviewed by: Snehal Blackwell DO on 12/03/2024:  Data reviewed : Ortho note ([facility]) from 11/14/24      COMPREHENSIVE METABOLIC PANEL  Order: [id number]  Component  Ref Range & Units 1 mo ago   Sodium  136 - 145 mmol/L 144   Comment: Excess protein and/or lipids can falsely decrease sodium levels (pseudo hyponatremia).   Potassium  3.5 - 5.1 mmol/L 4.0   Comment: Falsely elevated potassium can occur in patients with high WBC or platelet counts.   Chloride  98 - 107 mmol/L 107   Carbon Dioxide  22 - 29 mmol/L 28   Anion Gap  5 - 13 mmol/L 9   Comment: Calculation: Na - (Cl + CO2)   Glucose, Random  71 - 99 mg/dL 92   Blood Urea Nitrogen (BUN)  10 - 20 mg/dL 17   Creatinine, Blood  0.55 - 1.02 mg/dL 0.81   BUN/Creatinine Ratio  RATIO 21.0   Estimated GFR (Cr)  >60 mL/min/1.73m2 75   Comment: eGFR  "calculated based on IDMS traceable, enzymatic creatinine method using the CKD-EPI 2021 equation.   Total Protein  6.2 - 8.0 g/dL 6.3   Albumin  3.2 - 4.6 g/dL 3.9   Globulin  1.5 - 4.5 g/dL 2.4   Albumin/Globulin Ratio  1.1 - 2.5 RATIO 1.6   Calcium  8.4 - 10.2 mg/dL 9.6   Total Bilirubin  0.2 - 1.2 mg/dL 0.6   AST/SGOT  10 - 35 U/L 41 High    ALT/SGPT  10 - 35 U/L 36 High    Alkaline Phosphatase  40 - 150 U/L 113   Resulting Agency [facility]     Specimen Collected: 11/08/24 10:07    Performed by: Tykoon Last Resulted: 11/08/24 14:05   Received From: [facility] ([facility])  Result Received: 12/03/24 10:21       VITAMIN D,25-HYDROXY  Order: [id number]  Component  Ref Range & Units 1 mo ago   Vitamin D, 25-Hydroxy  >=30.0 ng/mL 32.3   Resulting Agency [facility]     Specimen Collected: 11/08/24 10:07    Performed by: Tykoon Last Resulted: 11/08/24 14:15   Received From: [facility] ([facility])  Result Received: 12/03/24 10:21           /68   Pulse 84   Ht 165.1 cm (65\")   Wt 69.4 kg (153 lb)   LMP  (LMP Unknown)   SpO2 96%   BMI 25.46 kg/m²        Physical Exam  Vital signs reviewed.   General: Well developed, well nourished; No acute distress.  Eyes: conjunctiva clear; pupils equally round and reactive  ENT: external ears and nose atraumatic; hearing normal  CV: no peripheral edema, 2+ distal pulses  Resp: normal respiratory effort, no use of accessory muscles  Skin: normal color and pigmentation; no rashes or wounds; normal turgor  Psych: alert and oriented; mood and affect appropriate; recent and remote memory intact  Neuro: sensation to light touch intact    MSK Exam:  The right ankle is without obvious signs of acute bony deformity, or erythema. There is resolving swelling and dependent bruising at the toes. There is no tenderness to the medial joint line or distal tibia. There is focal tenderness to the distal fibula. There is no tenderness to the lateral joint " line. There is no bony crepitus or step-off. There is no midfoot or forefoot tenderness. Active range of motion is painful. Tibia-fibula squeeze, calcaneal squeeze, and forefoot squeeze tests are negative. Marrero's test and Homans sign are negative.        Right Ankle X-Ray  Indication: Pain  Views: AP, Lateral, Mortise  Findings:  Nondisplaced transverse fracture of the distal fibula proximal to the syndesmosis  No bony lesion  Soft tissues normal  Diffuse degenerative changes  Pes planus and hindfoot valgus  Plantar calcaneal spur  No prior studies available for comparison.      Assessment and Plan  Diagnoses and all orders for this visit:    1. Acute right ankle pain (Primary)  -     XR Ankle 3+ View Right    2. Traumatic closed nondisplaced fracture of distal fibula, right, initial encounter    Dorinda is a 79 y.o. year old female here today for right ankle pain that has been present for the past week and half after one of her dogs ran in to her. We reviewed images and exam findings. Initial x-rays show a nondisplaced transverse fracture at the distal fibula proximal to the syndesmosis. Typically, there is concern for ankle instability with these. However, given the nature of the injury, there does not seem to be any associated soft tissue injury. I recommended trial of conservative management.      Assessment & Plan  She will be provided with a boot (with shoe lift for other foot) for stability and advised to use a cane to limit WBAT. Elevation of the leg above heart level is recommended to help with swelling. She is advised to avoid putting weight on the ankle if it causes pain. If necessary, crutches or a walker may be used.     2. Osteopenia.  The patient has a history of osteopenia and is currently taking vitamin D and calcium supplements to support bone density. A recent DEXA scan was performed, and her vitamin D levels are good.    Follow-up  Patient is scheduled for a follow-up visit in 2 weeks.  All of  her questions were answered and she is agreeable with the plan.    Dictated utilizing Dragon dictation.  Patient or patient representative verbalized consent for the use of Ambient Listening during the visit with  Snehal Blackwell DO for chart documentation. 12/3/2024  10:46 EST

## 2024-12-17 ENCOUNTER — TELEPHONE (OUTPATIENT)
Dept: SPORTS MEDICINE | Facility: CLINIC | Age: 79
End: 2024-12-17
Payer: MEDICARE

## 2024-12-17 NOTE — TELEPHONE ENCOUNTER
Called patient with no answer. LVM requesting call back. She immediately called back.    She is currently at [facility] acute Rehab being treated for pneumonia.  Plans to be discharged on Friday. She is unable to make follow-up appointment and is asking about getting imaging done while in rehab. Will just plan to see her next Monday in the office and obtain updated x-rays then.

## 2024-12-17 NOTE — TELEPHONE ENCOUNTER
Caller: ALDA MARTINEZ    Phone Number: [phone] (home)     Reason for Call:  PATIENT WOULD LIKE TO KNOW IF SHE GETS IMAGING DONE WHILE AT [facility] IF DR. MOSES WOULD BE OKAY WITH THAT AND SPEAK WITH HER AFTER SHE SEES IMAGING.

## 2024-12-19 ENCOUNTER — TRANSCRIBE ORDERS (OUTPATIENT)
Dept: HOME HEALTH SERVICES | Facility: HOME HEALTHCARE | Age: 79
End: 2024-12-19
Payer: MEDICARE

## 2024-12-19 DIAGNOSIS — I50.9 CONGESTIVE HEART FAILURE, UNSPECIFIED HF CHRONICITY, UNSPECIFIED HEART FAILURE TYPE: Primary | ICD-10-CM

## 2024-12-23 ENCOUNTER — OFFICE VISIT (OUTPATIENT)
Dept: SPORTS MEDICINE | Facility: CLINIC | Age: 79
End: 2024-12-23
Payer: MEDICARE

## 2024-12-23 VITALS — HEIGHT: 65 IN | TEMPERATURE: 98.4 F | WEIGHT: 153 LBS | BODY MASS INDEX: 25.49 KG/M2

## 2024-12-23 DIAGNOSIS — S82.831D CLOSED TRAUMATIC NONDISPLACED FRACTURE OF DISTAL END OF RIGHT FIBULA WITH ROUTINE HEALING, SUBSEQUENT ENCOUNTER: Primary | ICD-10-CM

## 2024-12-23 RX ORDER — PANTOPRAZOLE SODIUM 40 MG/1
40 TABLET, DELAYED RELEASE ORAL DAILY
COMMUNITY
Start: 2024-12-11 | End: 2025-01-10

## 2024-12-23 RX ORDER — AMIODARONE HYDROCHLORIDE 200 MG/1
TABLET ORAL
COMMUNITY
Start: 2024-12-11 | End: 2025-01-18

## 2024-12-23 RX ORDER — POTASSIUM CHLORIDE 750 MG/1
TABLET, EXTENDED RELEASE ORAL
COMMUNITY
Start: 2024-12-18

## 2024-12-23 RX ORDER — METOPROLOL SUCCINATE 100 MG/1
100 TABLET, EXTENDED RELEASE ORAL DAILY
COMMUNITY
Start: 2024-12-12

## 2024-12-23 RX ORDER — FUROSEMIDE 20 MG/1
20 TABLET ORAL DAILY
COMMUNITY
Start: 2024-12-12

## 2024-12-23 NOTE — PROGRESS NOTES
"Chief Complaint   Patient presents with    Right Ankle - Follow-up     Right ankle fx - ankle feels like it is doing much better       History of Present Illness  Dorinda is a 79 y.o. year old female here today for follow-up of right ankle pain that has been present since 11/28/2024 when one of her dogs ran in to her lateral leg. Initial x-rays show a nondisplaced transverse fracture at the distal fibula proximal to the syndesmosis. Typically, there is concern for ankle instability with these. However, given the nature of the injury and exam, there does not seem to be any associated soft tissue injury. I recommended trial of conservative management with a cam boot and supportive measures. Please see previous notes for complete history and treatment course.   History of Present Illness  She reports an improvement in her ankle condition, attributing it to limited mobility due to her recent hospitalization. She is not experiencing any pain while walking with the boot on. There has been no recent swelling in the ankle. She is not currently on any pain medication as she is not experiencing any pain. She is scheduled to see Dr. Braswell for her knees in February. She is back home now and is supposed to receive home OT and P visits.    Of note, on the day of her last visit, she experienced an episode of atrial fibrillation, which she was unaware of at the time. This led to a visit to the emergency room where she was diagnosed with pneumonia, triggering the atrial fibrillation. She was subsequently admitted to the ICU for 5 days. She continues to experience shortness of breath, which she finds distressing and anxiety-inducing. She is not currently on oral steroids.    MEDICATIONS  Current: Vitamin D, calcium, Lasix.  She has a history of osteopenia and takes vitamin D and calcium supplements.         Temp 98.4 °F (36.9 °C) (Infrared)   Ht 165.1 cm (65\")   Wt 69.4 kg (153 lb)   LMP  (LMP Unknown)   BMI 25.46 kg/m²  "       Physical Exam  MSK Exam:  The right ankle is without obvious signs of acute bony deformity, or erythema.  Swelling and bruising has resolved.  There is no tenderness to the medial joint line or distal tibia. There is persistent but improved focal tenderness to the distal fibula at the site of the fracture. There is no tenderness to the lateral joint line. There is no bony crepitus or step-off. There is no midfoot or forefoot tenderness. Active range of motion of the ankle is limited but without significant pain. Tibia-fibula squeeze is negative.        Right Ankle X-Ray  Indication: Pain  Views: AP, Lateral, Mortise  Findings:  Nondisplaced transverse fracture of the distal fibula proximal to the syndesmosis  No bony lesion  Soft tissues normal  Diffuse degenerative changes  Pes planus and hindfoot valgus  Plantar calcaneal spur  No change compared to images from 12/3/2024      Assessment and Plan  Diagnoses and all orders for this visit:    1. Closed traumatic nondisplaced fracture of distal end of right fibula with routine healing, subsequent encounter (Primary)      Dorinda is a 79 y.o. year old female here today for follow-up of right ankle pain that has been present since 11/28/2024 when one of her dogs ran in to her lateral leg. Initial x-rays show a nondisplaced transverse fracture at the distal fibula proximal to the syndesmosis. Typically, there is concern for ankle instability with these. However, given the nature of the injury and exam, there does not seem to be any associated soft tissue injury. I recommended trial of conservative management with a cam boot and supportive measures.  She returns today with improved symptoms, though she is still recovering from her recent hospitalization due to A-fib and pneumonia.  Assessment & Plan  Repeat x-rays with no signs of significant healing, but no worsening displacement or angulation.  Given her age and osteopenia, the healing process may be slower. Her  strength appears to be satisfactory, considering her recent illness and hospitalization. She is advised to continue wearing the boot for an additional 3 weeks. She may perform gentle range of motion at the ankle and toe exercises, such as pulling her toes back, pointing them down, and moving them in and out. She should wear the boot when standing for extended periods but does not need to sleep in it. She is also advised to continue use of the walker for extra support until her energy levels improve and her shortness of breath subsides.    Follow-up  The patient will follow up in 3 weeks.  All of her questions were answered and she is agreeable with the plan.    Dictated utilizing Dragon dictation.  Patient or patient representative verbalized consent for the use of Ambient Listening during the visit with  Snehal Blackwell DO for chart documentation. 12/23/2024  14:23 EST

## 2024-12-27 ENCOUNTER — TELEPHONE (OUTPATIENT)
Dept: SPORTS MEDICINE | Facility: CLINIC | Age: 79
End: 2024-12-27
Payer: MEDICARE

## 2024-12-27 NOTE — TELEPHONE ENCOUNTER
Tamara Brooke from [facility] called requesting PT/OT orderes for this patient. She stated that she had orders from the rehab facility she was in but they only lasted for a week. They are requesting new orders to be put in by DR Blackwell for the foot. Please call Tamara Brooke back once orders are put in at [phone].

## 2024-12-30 NOTE — TELEPHONE ENCOUNTER
I called Tamara Edwardo back to discuss plan of care.  Unfortunately, since her last message on 12/27, Dorinda has been readmitted to the hospital.  Tamara will plan on waiting for any potential updates with the hospital and primary care team following her discharge.  We will touch base as needed afterwards.  Verbal orders were provided for general recommendations in regards to her fracture and general deconditioning to be initiated should Dorinda be discharged soon with no significant changes.

## 2025-01-08 ENCOUNTER — TRANSCRIBE ORDERS (OUTPATIENT)
Dept: HOME HEALTH SERVICES | Facility: HOME HEALTHCARE | Age: 80
End: 2025-01-08
Payer: MEDICARE

## 2025-01-08 DIAGNOSIS — N39.0 URINARY TRACT INFECTION WITHOUT HEMATURIA, SITE UNSPECIFIED: Primary | ICD-10-CM

## 2025-01-10 ENCOUNTER — HOME CARE VISIT (OUTPATIENT)
Dept: HOME HEALTH SERVICES | Facility: HOME HEALTHCARE | Age: 80
End: 2025-01-10

## 2025-01-10 NOTE — CASE COMMUNICATION
Attempted to set up SOC visit for this am.  Left confidential VM and called multiple times.  No call return.  Also, called daughters phone and no answer.  Notified .

## 2025-01-13 ENCOUNTER — OFFICE VISIT (OUTPATIENT)
Dept: SPORTS MEDICINE | Facility: CLINIC | Age: 80
End: 2025-01-13
Payer: MEDICARE

## 2025-01-13 VITALS — TEMPERATURE: 98.2 F | HEIGHT: 64 IN | WEIGHT: 145 LBS | BODY MASS INDEX: 24.75 KG/M2

## 2025-01-13 DIAGNOSIS — R29.898 ANKLE WEAKNESS: ICD-10-CM

## 2025-01-13 DIAGNOSIS — S82.831D CLOSED TRAUMATIC NONDISPLACED FRACTURE OF DISTAL END OF RIGHT FIBULA WITH ROUTINE HEALING, SUBSEQUENT ENCOUNTER: Primary | ICD-10-CM

## 2025-01-19 NOTE — PROGRESS NOTES
"Chief Complaint   Patient presents with    Right Ankle - Follow-up       History of Present Illness  Dorinda is a 79 y.o. year old female here today for follow-up of right ankle pain that has been present since 11/28/2024 when one of her dogs ran in to her lateral leg. Initial x-rays show a nondisplaced transverse fracture at the distal fibula proximal to the syndesmosis. Typically, there is concern for ankle instability with these. However, given the nature of the injury and exam, there does not seem to be any associated soft tissue injury. I recommended trial of conservative management with a cam boot and supportive measures. Please see previous notes for complete history and treatment course.   History of Present Illness  She reports an improvement in her ankle condition, with the ability to bear more weight on it. She has been able to ambulate from her bed to the bathroom without the aid of a walker. She is not experiencing any pain, numbness, or tingling sensations in the ankle. She has not yet started physical therapy as they did not make it to her house. She is planning to start outpatient physical therapy.    Supplemental Information  Since her last visit, she was again hospitalized due to low blood pressure, which was 85 and dropped to 75 when she stood up. She is feeling better now with th changes to her medication. She has a lot of reactions to medications but has to stay on them for a while.    MEDICATIONS  Current: Vitamin D, calcium, Lasix.  She has a history of osteopenia and takes vitamin D and calcium supplements.         Temp 98.2 °F (36.8 °C) (Temporal)   Ht 162.6 cm (64.02\")   Wt 65.8 kg (145 lb)   LMP  (LMP Unknown)   BMI 24.88 kg/m²        Physical Exam  MSK Exam:  The right ankle is without obvious signs of acute bony deformity, or erythema.  There is no tenderness to the medial joint line or distal tibia. She denies focal tenderness to the distal fibula. There is a subtle palpable callus " at the site of the fracture. There is no tenderness to the lateral joint line. There is no bony crepitus or step-off. There is no midfoot or forefoot tenderness. Active range of motion of the ankle is pain-free. There is weakness of the ankle, but no pain. Instability test are negative with anterior drawer, talar tilt and eversion stress tests.        Right Ankle X-Ray  Indication: Pain  Views: AP, Lateral, Mortise  Findings:  Nondisplaced transverse fracture of the distal fibula proximal to the syndesmosis with interval callus formation.  Fracture line is still visible but slightly less than previous  No bony lesion  Soft tissues normal  Diffuse degenerative changes  Pes planus and hindfoot valgus  Plantar calcaneal spur  Compared to images from 12/3/2024 and 12/23/24      Assessment and Plan  Diagnoses and all orders for this visit:    1. Closed traumatic nondisplaced fracture of distal end of right fibula with routine healing, subsequent encounter (Primary)  -     XR Ankle 3+ View Right  -     Ambulatory Referral to Physical Therapy for Evaluation & Treatment    2. Ankle weakness  -     Ambulatory Referral to Physical Therapy for Evaluation & Treatment      Dorinda is a 79 y.o. year old female here today for follow-up of right ankle pain that has been present since 11/28/2024 when one of her dogs ran in to her lateral leg. Initial x-rays show a nondisplaced transverse fracture at the distal fibula proximal to the syndesmosis. Typically, there is concern for ankle instability with these. However, given the nature of the injury and exam, there does not seem to be any associated soft tissue injury. I recommended trial of conservative management with a cam boot and supportive measures.  She returns last visit with improved symptoms, though she was still recovering from her recent hospitalization due to A-fib and pneumonia. Today, she reports continued improvement of her ankle symptoms, but was again hospitalized due  to issues with hypotension and UTI.  Repeat x-rays today with decreased fracture lucency and surrounding callus formation, which is reassuring.  He has been roughly 6 weeks since the initial injury.  I would like to get her out of the boot, but with persistent fracture lucency visualized, she was placed in an Aircast to allow for improved mobility but with continued protection and support.  So long as she remains pain-free with weightbearing and ambulation, she may wean out of the boot and transition to use of the Aircast with a good supportive shoe.  We discussed that she may continue to require an assistive device with ambulation and falls.  An order was provided for physical therapy to help assist her with her recovery.    We will follow-up in another 4 weeks.  All of her questions were answered and she is agreeable with the plan.    Dictated utilizing Dragon dictation.  Patient or patient representative verbalized consent for the use of Ambient Listening during the visit with  Snehal Blackwell DO for chart documentation. 01/13/2025  15:01 EST

## 2025-02-13 ENCOUNTER — OFFICE VISIT (OUTPATIENT)
Dept: SPORTS MEDICINE | Facility: CLINIC | Age: 80
End: 2025-02-13
Payer: MEDICARE

## 2025-02-13 VITALS — WEIGHT: 136 LBS | BODY MASS INDEX: 23.22 KG/M2 | HEIGHT: 64 IN

## 2025-02-13 DIAGNOSIS — M21.069 ACQUIRED GENU VALGUM, UNSPECIFIED LATERALITY: ICD-10-CM

## 2025-02-13 DIAGNOSIS — R29.898 ANKLE WEAKNESS: ICD-10-CM

## 2025-02-13 DIAGNOSIS — M21.079 ACQUIRED VALGUS DEFORMITY OF ANKLE: ICD-10-CM

## 2025-02-13 DIAGNOSIS — M21.41 PES PLANUS OF BOTH FEET: ICD-10-CM

## 2025-02-13 DIAGNOSIS — M21.42 PES PLANUS OF BOTH FEET: ICD-10-CM

## 2025-02-13 DIAGNOSIS — S82.831D CLOSED TRAUMATIC NONDISPLACED FRACTURE OF DISTAL END OF RIGHT FIBULA WITH ROUTINE HEALING, SUBSEQUENT ENCOUNTER: Primary | ICD-10-CM

## 2025-02-13 PROCEDURE — 99213 OFFICE O/P EST LOW 20 MIN: CPT | Performed by: STUDENT IN AN ORGANIZED HEALTH CARE EDUCATION/TRAINING PROGRAM

## 2025-02-13 PROCEDURE — 1160F RVW MEDS BY RX/DR IN RCRD: CPT | Performed by: STUDENT IN AN ORGANIZED HEALTH CARE EDUCATION/TRAINING PROGRAM

## 2025-02-13 PROCEDURE — 1159F MED LIST DOCD IN RCRD: CPT | Performed by: STUDENT IN AN ORGANIZED HEALTH CARE EDUCATION/TRAINING PROGRAM

## 2025-02-13 NOTE — PROGRESS NOTES
"Chief Complaint   Patient presents with    Right Ankle - Follow-up, Pain       History of Present Illness  Dorinda is a 80 y.o. year old female here today for follow-up of right ankle pain that has been present since 11/28/2024 when one of her dogs ran in to her lateral leg. Initial x-rays showed a nondisplaced transverse fracture at the distal fibula proximal to the syndesmosis. Typically, there is concern for ankle instability with these. However, given the nature of the injury and exam, there did not seem to be any associated soft tissue injury. I recommended trial of conservative management with a cam boot and supportive measures.  During her recovery, she has been hospitalized on 2 separate occasions. However, she reports continued improvement of her ankle symptoms.  Repeat x-rays have shown decreased fracture lucency and surrounding callus formation, which is reassuring.  Please see previous notes for complete history and treatment course.   History of Present Illness  She reports a significant improvement in her overall condition. She has initiated physical therapy, which she believes has been beneficial. She experiences occasional pain when stepping in certain ways, but overall, her condition is improving daily. She has discontinued the use of a cane and does not experience any associated weakness. She also reports that her back was adversely affected during her hospitalization. She has been using shoe inserts for an extended period and has tried several orthotics without success. The current insert, purchased online, appears to be more effective than previous ones.    MEDICATIONS  Current: Vitamin D, calcium, Lasix.  She has a history of osteopenia and takes vitamin D and calcium supplements.         Ht 163.8 cm (64.49\")   Wt 61.7 kg (136 lb)   LMP  (LMP Unknown)   BMI 22.99 kg/m²        Physical Exam  MSK Exam:  The right ankle is without obvious signs of acute bony deformity, or erythema.  There is no " tenderness to the medial joint line or distal tibia. There is a palpable callus formation at the site of the distal fibula fracture with trace tenderness. There is no tenderness to the lateral joint line. There is no bony crepitus or step-off. There is no midfoot or forefoot tenderness. Active range of motion of the ankle is pain-free. There is mild weakness of the ankle, with only mild pain with resisted inversion. Instability test are negative with anterior drawer, talar tilt and eversion stress tests. There is significant pes planus bilaterally and hindfoot/ankle valgus. Bilateral genu valgus as well. Gait is antalgic, but appears to be more due to genu valgus and foot/ankle changes than due to pain.        Right Ankle X-Ray  Indication: Pain  Views: AP, Lateral, Mortise  Findings:  Nondisplaced transverse fracture of the distal fibula proximal to the syndesmosis with interval callus formation.   No bony lesion  Soft tissues normal  Diffuse degenerative changes  Pes planus and hindfoot valgus  Plantar calcaneal spur  Continued progression of callus and bridging compared to previous images      Assessment and Plan  Diagnoses and all orders for this visit:    1. Closed traumatic nondisplaced fracture of distal end of right fibula with routine healing, subsequent encounter (Primary)    2. Ankle weakness    3. Pes planus of both feet    4. Acquired valgus deformity of ankle    5. Acquired genu valgum, unspecified laterality    Dorinda is a 80 y.o. year old female here today for follow-up of right ankle pain that has been present since 11/28/2024 when one of her dogs ran in to her lateral leg. Initial x-rays showed a nondisplaced transverse fracture at the distal fibula proximal to the syndesmosis. Typically, there is concern for ankle instability with these. However, given the nature of the injury and exam, there did not seem to be any associated soft tissue injury. I recommended trial of conservative management with  a cam boot and supportive measures.  During her recovery, she has been hospitalized on 2 separate occasions. However, she reports continued improvement of her ankle symptoms.  Repeat x-rays have shown decreased fracture lucency and surrounding callus formation, which is reassuring.    Assessment & Plan  1. Ankle pain.  Her bone health is suboptimal, which may contribute to a prolonged healing process. Despite this, there has been significant improvement in her condition over the past 4 weeks. The persistent mild tenderness suggests that further healing is necessary. Her gait exhibits a slight limp, but appears to be more due to genu valgus and chronic foot/ankle changes than due to pain. She will continue her physical therapy regimen to aid in strength recovery and mobility improvement. A consultation with Dr. Rose, a podiatrist, may be considered to discuss potential recommendations for orthotics. An additional set of x-rays will be obtained in 4 weeks to monitor the healing progress. If the fracture line persists, a referral to orthopedics will be considered.    Follow-up  The patient will follow up in 4 weeks.  All of her questions were answered and she is agreeable with the plan.    Dictated utilizing Dragon dictation.  Patient or patient representative verbalized consent for the use of Ambient Listening during the visit with  Snehal Blackwell DO for chart documentation. 2/13/2025  11:53 EST

## 2025-02-20 ENCOUNTER — PATIENT MESSAGE (OUTPATIENT)
Dept: SPORTS MEDICINE | Facility: CLINIC | Age: 80
End: 2025-02-20
Payer: MEDICARE

## 2025-03-03 ENCOUNTER — DOCUMENTATION (OUTPATIENT)
Dept: SPORTS MEDICINE | Facility: CLINIC | Age: 80
End: 2025-03-03
Payer: MEDICARE

## 2025-03-05 DIAGNOSIS — M21.42 PES PLANUS OF BOTH FEET: Primary | ICD-10-CM

## 2025-03-05 DIAGNOSIS — M21.079 ACQUIRED VALGUS DEFORMITY OF ANKLE: ICD-10-CM

## 2025-03-05 DIAGNOSIS — M21.069 ACQUIRED GENU VALGUM, UNSPECIFIED LATERALITY: ICD-10-CM

## 2025-03-05 DIAGNOSIS — M21.41 PES PLANUS OF BOTH FEET: Primary | ICD-10-CM

## 2025-03-06 ENCOUNTER — TELEPHONE (OUTPATIENT)
Dept: SPORTS MEDICINE | Facility: CLINIC | Age: 80
End: 2025-03-06

## 2025-03-06 NOTE — TELEPHONE ENCOUNTER
Caller: Dorinda Martinez    Relationship to patient: Self    Best call back number: [phone] (home)     Chief complaint: LEFT WRIST PAIN    Type of visit: NEW PROBLEM    Requested date: TODAY 3/6/25    Additional notes:MS MARTINEZ WOULD LIKE TO BE SEEN FOR HER LEFT WRIST AND STATED SHE CANNOT WAIT UNTIL THE NEXT AVAILABLE APPT ON 3/11/25

## 2025-03-06 NOTE — TELEPHONE ENCOUNTER
Caller: Dorinda Elder    Relationship to patient: Self    Best call back number:     Chief complaint: LEFT WRIST    Type of visit: NEW PROBLEM    Requested date: ASAP     Additional notes:PATIENT IS WANTING TO KNOW IF THERE IS ANYWAY SHE CAN BE FIT IN FOR A VISIT TODAY. SHE STATED SHE IS IN SEVERE PAIN AND WOULD LIKE TO SEE SOMEONE.

## 2025-03-06 NOTE — TELEPHONE ENCOUNTER
Caller: Dorinda Elder    Relationship: Self    Best call back number:     What is the best time to reach you: ANY     Who are you requesting to speak with (clinical staff, provider,  specific staff member): CLINICAL    What was the call regarding: PATIENT FELL ON LEFT WRIST WHILE WORKING OUT AND WANTED TO SEE IF WE COULD SEND OVER AN ORDER FOR IMAGING AT [facility] FOR HER    Is it okay if the provider responds through [facility]: PLEASE CALL

## 2025-03-24 ENCOUNTER — PATIENT ROUNDING (BHMG ONLY) (OUTPATIENT)
Age: 80
End: 2025-03-24
Payer: MEDICARE

## 2025-03-24 ENCOUNTER — OFFICE VISIT (OUTPATIENT)
Dept: SPORTS MEDICINE | Facility: CLINIC | Age: 80
End: 2025-03-24
Payer: MEDICARE

## 2025-03-24 ENCOUNTER — OFFICE VISIT (OUTPATIENT)
Age: 80
End: 2025-03-24
Payer: MEDICARE

## 2025-03-24 VITALS — WEIGHT: 136 LBS | TEMPERATURE: 98.1 F | HEIGHT: 65 IN | BODY MASS INDEX: 22.66 KG/M2

## 2025-03-24 VITALS — HEIGHT: 65 IN | WEIGHT: 136 LBS | RESPIRATION RATE: 20 BRPM | BODY MASS INDEX: 22.66 KG/M2

## 2025-03-24 DIAGNOSIS — M21.42 PES PLANUS OF BOTH FEET: ICD-10-CM

## 2025-03-24 DIAGNOSIS — M76.829 POSTERIOR TIBIAL TENDON DYSFUNCTION: Primary | ICD-10-CM

## 2025-03-24 DIAGNOSIS — G89.29 CHRONIC PAIN OF RIGHT ANKLE: ICD-10-CM

## 2025-03-24 DIAGNOSIS — M25.571 CHRONIC PAIN OF RIGHT ANKLE: ICD-10-CM

## 2025-03-24 DIAGNOSIS — S82.831D CLOSED TRAUMATIC NONDISPLACED FRACTURE OF DISTAL END OF RIGHT FIBULA WITH ROUTINE HEALING, SUBSEQUENT ENCOUNTER: Primary | ICD-10-CM

## 2025-03-24 DIAGNOSIS — M21.41 PES PLANUS OF BOTH FEET: ICD-10-CM

## 2025-03-24 DIAGNOSIS — M19.072 PRIMARY OSTEOARTHRITIS OF LEFT FOOT: ICD-10-CM

## 2025-03-24 DIAGNOSIS — M19.071 PRIMARY OSTEOARTHRITIS OF RIGHT FOOT: ICD-10-CM

## 2025-03-24 PROCEDURE — 1159F MED LIST DOCD IN RCRD: CPT | Performed by: PODIATRIST

## 2025-03-24 PROCEDURE — 1160F RVW MEDS BY RX/DR IN RCRD: CPT | Performed by: PODIATRIST

## 2025-03-24 PROCEDURE — 99204 OFFICE O/P NEW MOD 45 MIN: CPT | Performed by: PODIATRIST

## 2025-03-24 RX ORDER — ANTACID TABLETS 500 MG/1
500 TABLET, CHEWABLE ORAL
COMMUNITY
Start: 2024-12-18

## 2025-03-24 RX ORDER — AMIODARONE HYDROCHLORIDE 200 MG/1
200 TABLET ORAL DAILY
COMMUNITY
Start: 2024-12-18

## 2025-03-24 RX ORDER — POTASSIUM CHLORIDE 750 MG/1
10 TABLET, FILM COATED, EXTENDED RELEASE ORAL
COMMUNITY
Start: 2024-12-18

## 2025-03-24 RX ORDER — ASPIRIN 81 MG/1
81 TABLET, CHEWABLE ORAL
COMMUNITY
Start: 2024-12-18
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
## 2025-04-11 NOTE — PROGRESS NOTES
"Chief Complaint   Patient presents with    Right Ankle - Follow-up       History of Present Illness  Dorinda is a 80 y.o. year old female here today for follow-up of right ankle pain that has been present since 11/28/2024 when one of her dogs ran in to her lateral leg. Initial x-rays showed a nondisplaced transverse fracture at the distal fibula proximal to the syndesmosis. Typically, there is concern for ankle instability with these. However, given the nature of the injury and exam, there did not seem to be any associated soft tissue injury. I recommended trial of conservative management with a cam boot and supportive measures.  During her recovery, she has been hospitalized on 2 separate occasions. However, she reports continued improvement of her ankle symptoms.  Repeat x-rays have shown decreased fracture lucency and surrounding callus formation, which is reassuring.  Please see previous notes for complete history and treatment course.   History of Present Illness  She reports a satisfactory recovery from her ankle fracture, with no pain experienced. She has been ambulatory without the aid of a boot for an extended period and does not experience any discomfort during walking. She is able to perform Pilates twice weekly. No new injuries or complaints.    Supplemental Information  She had an ablation done on Monday.    MEDICATIONS  Current: Vitamin D, calcium, Lasix.  She has a history of osteopenia and takes vitamin D and calcium supplements.         Temp 98.1 °F (36.7 °C) (Infrared)   Ht 163.8 cm (64.5\")   Wt 61.7 kg (136 lb)   LMP  (LMP Unknown)   BMI 22.98 kg/m²        Physical Exam  MSK Exam:  The right ankle is without obvious signs of acute bony deformity, or erythema.  There is no tenderness to the medial joint line or distal tibia. There is a palpable callus formation at the site of the distal fibula fracture with complete resolution of tenderness, even with firm palpation. There is no tenderness to " the lateral joint line. There is no midfoot or forefoot tenderness. Active range of motion of the ankle is pain-free. There is significant pes planus bilaterally and hindfoot/ankle valgus. Bilateral genu valgus as well.      Right Ankle X-Ray (obtained by Dr. Rose earlier today)  Indication: Pain  Views: AP, Lateral, Mortise  Findings:  Nondisplaced transverse fracture of the distal fibula proximal to the syndesmosis with continued interval callus and sclerosis formation. Fracture lucency no longer visible.  No bony lesion  Soft tissues normal  Diffuse degenerative changes  Pes planus and hindfoot valgus  Plantar calcaneal spur  Continued progression of callus and bridging compared to previous images      Assessment and Plan  Diagnoses and all orders for this visit:    1. Closed traumatic nondisplaced fracture of distal end of right fibula with routine healing, subsequent encounter (Primary)      Dorinda is a 80 y.o. year old female here today for follow-up of right ankle pain that has been present since 11/28/2024 when one of her dogs ran in to her lateral leg. Initial x-rays showed a nondisplaced transverse fracture at the distal fibula proximal to the syndesmosis. Typically, there is concern for ankle instability with these. However, given the nature of the injury and exam, there did not seem to be any associated soft tissue injury. I recommended trial of conservative management with a cam boot and supportive measures.  During her recovery, she has been hospitalized on 2 separate occasions. However, she reports continued improvement of her ankle symptoms.  Repeat x-rays have shown decreased fracture lucency and surrounding callus formation, which is reassuring.    Assessment & Plan  The patient's ankle fracture is demonstrating satisfactory healing progress. The bone is not the primary weight-bearing structure, which is beneficial for the healing process. The fracture line is much less visible compared to  previous x-rays, indicating good bone healing progression. She may continue with activity as tolerated and progress as tolerated. She should gradually increase her activity level and monitor for any discomfort. If she experiences persistent pain or worsening symptoms, she should inform the clinic immediately.    We will follow-up as needed.  All of her questions were answered and she is agreeable with the plan.    Dictated utilizing Dragon dictation.  Patient or patient representative verbalized consent for the use of Ambient Listening during the visit with  Snehal Blackwell DO for chart documentation. 3/24/2025  11:22 EST

## (undated) DEVICE — KT ORCA ORCAPOD DISP STRL

## (undated) DEVICE — CANN O2 ETCO2 FITS ALL CONN CO2 SMPL A/ 7IN DISP LF

## (undated) DEVICE — PATIENT RETURN ELECTRODE, SINGLE-USE, CONTACT QUALITY MONITORING, ADULT, WITH 9FT CORD, FOR PATIENTS WEIGING OVER 33LBS. (15KG): Brand: MEGADYNE

## (undated) DEVICE — SENSR O2 OXIMAX FNGR A/ 18IN NONSTR

## (undated) DEVICE — SNAR POLYP SENSATION STDOVL 27 240 BX40

## (undated) DEVICE — THE SINGLE USE ETRAP – POLYP TRAP IS USED FOR SUCTION RETRIEVAL OF ENDOSCOPICALLY REMOVED POLYPS.: Brand: ETRAP

## (undated) DEVICE — TUBING, SUCTION, 1/4" X 10', STRAIGHT: Brand: MEDLINE

## (undated) DEVICE — LN SMPL CO2 SHTRM SD STREAM W/M LUER

## (undated) DEVICE — ADAPT CLN BIOGUARD AIR/H2O DISP